# Patient Record
Sex: FEMALE | Race: WHITE | NOT HISPANIC OR LATINO | Employment: FULL TIME | URBAN - NONMETROPOLITAN AREA
[De-identification: names, ages, dates, MRNs, and addresses within clinical notes are randomized per-mention and may not be internally consistent; named-entity substitution may affect disease eponyms.]

---

## 2021-02-08 ENCOUNTER — IMMUNIZATIONS (OUTPATIENT)
Dept: FAMILY MEDICINE CLINIC | Facility: HOSPITAL | Age: 46
End: 2021-02-08

## 2021-02-08 DIAGNOSIS — Z23 ENCOUNTER FOR IMMUNIZATION: Primary | ICD-10-CM

## 2021-02-08 PROCEDURE — 0011A SARS-COV-2 / COVID-19 MRNA VACCINE (MODERNA) 100 MCG: CPT

## 2021-02-08 PROCEDURE — 91301 SARS-COV-2 / COVID-19 MRNA VACCINE (MODERNA) 100 MCG: CPT

## 2021-02-26 ENCOUNTER — APPOINTMENT (EMERGENCY)
Dept: RADIOLOGY | Facility: HOSPITAL | Age: 46
End: 2021-02-26
Payer: COMMERCIAL

## 2021-02-26 ENCOUNTER — HOSPITAL ENCOUNTER (EMERGENCY)
Facility: HOSPITAL | Age: 46
Discharge: HOME | End: 2021-02-26
Attending: EMERGENCY MEDICINE | Admitting: EMERGENCY MEDICINE
Payer: COMMERCIAL

## 2021-02-26 ENCOUNTER — APPOINTMENT (EMERGENCY)
Dept: CT IMAGING | Facility: HOSPITAL | Age: 46
End: 2021-02-26
Payer: COMMERCIAL

## 2021-02-26 VITALS
RESPIRATION RATE: 16 BRPM | WEIGHT: 271.17 LBS | BODY MASS INDEX: 45.18 KG/M2 | HEIGHT: 65 IN | TEMPERATURE: 98 F | SYSTOLIC BLOOD PRESSURE: 104 MMHG | OXYGEN SATURATION: 98 % | HEART RATE: 79 BPM | DIASTOLIC BLOOD PRESSURE: 58 MMHG

## 2021-02-26 DIAGNOSIS — R07.89 CHEST WALL PAIN: Primary | ICD-10-CM

## 2021-02-26 LAB
ALBUMIN SERPL BCP-MCNC: 3.2 G/DL (ref 3.5–5)
ALP SERPL-CCNC: 80 U/L (ref 46–116)
ALT SERPL W P-5'-P-CCNC: 23 U/L (ref 12–78)
ANION GAP SERPL CALCULATED.3IONS-SCNC: 7 MMOL/L (ref 4–13)
APTT PPP: 29 SECONDS (ref 23–37)
AST SERPL W P-5'-P-CCNC: 40 U/L (ref 5–45)
BASOPHILS # BLD AUTO: 0.04 THOUSANDS/ΜL (ref 0–0.1)
BASOPHILS NFR BLD AUTO: 0 % (ref 0–1)
BILIRUB SERPL-MCNC: 0.44 MG/DL (ref 0.2–1)
BUN SERPL-MCNC: 13 MG/DL (ref 5–25)
CALCIUM ALBUM COR SERPL-MCNC: 9.6 MG/DL (ref 8.3–10.1)
CALCIUM SERPL-MCNC: 9 MG/DL (ref 8.3–10.1)
CHLORIDE SERPL-SCNC: 103 MMOL/L (ref 100–108)
CO2 SERPL-SCNC: 26 MMOL/L (ref 21–32)
CREAT SERPL-MCNC: 0.69 MG/DL (ref 0.6–1.3)
D DIMER PPP FEU-MCNC: 0.79 UG/ML FEU
EOSINOPHIL # BLD AUTO: 0.24 THOUSAND/ΜL (ref 0–0.61)
EOSINOPHIL NFR BLD AUTO: 3 % (ref 0–6)
ERYTHROCYTE [DISTWIDTH] IN BLOOD BY AUTOMATED COUNT: 12.7 % (ref 11.6–15.1)
GFR SERPL CREATININE-BSD FRML MDRD: 103 ML/MIN/1.73SQ M
GLUCOSE SERPL-MCNC: 86 MG/DL (ref 65–140)
HCT VFR BLD AUTO: 44.5 % (ref 34.8–46.1)
HGB BLD-MCNC: 14.6 G/DL (ref 11.5–15.4)
IMM GRANULOCYTES # BLD AUTO: 0.02 THOUSAND/UL (ref 0–0.2)
IMM GRANULOCYTES NFR BLD AUTO: 0 % (ref 0–2)
INR PPP: 0.99 (ref 0.84–1.19)
LACTATE SERPL-SCNC: 0.9 MMOL/L (ref 0.5–2)
LIPASE SERPL-CCNC: 104 U/L (ref 73–393)
LYMPHOCYTES # BLD AUTO: 2.15 THOUSANDS/ΜL (ref 0.6–4.47)
LYMPHOCYTES NFR BLD AUTO: 23 % (ref 14–44)
MCH RBC QN AUTO: 31.4 PG (ref 26.8–34.3)
MCHC RBC AUTO-ENTMCNC: 32.8 G/DL (ref 31.4–37.4)
MCV RBC AUTO: 96 FL (ref 82–98)
MONOCYTES # BLD AUTO: 0.81 THOUSAND/ΜL (ref 0.17–1.22)
MONOCYTES NFR BLD AUTO: 9 % (ref 4–12)
NEUTROPHILS # BLD AUTO: 6.05 THOUSANDS/ΜL (ref 1.85–7.62)
NEUTS SEG NFR BLD AUTO: 65 % (ref 43–75)
NRBC BLD AUTO-RTO: 0 /100 WBCS
NT-PROBNP SERPL-MCNC: 127 PG/ML
PLATELET # BLD AUTO: 221 THOUSANDS/UL (ref 149–390)
PMV BLD AUTO: 10.6 FL (ref 8.9–12.7)
POTASSIUM SERPL-SCNC: 5.4 MMOL/L (ref 3.5–5.3)
PROT SERPL-MCNC: 7.6 G/DL (ref 6.4–8.2)
PROTHROMBIN TIME: 12.9 SECONDS (ref 11.6–14.5)
RBC # BLD AUTO: 4.65 MILLION/UL (ref 3.81–5.12)
SODIUM SERPL-SCNC: 136 MMOL/L (ref 136–145)
TROPONIN I SERPL-MCNC: <0.02 NG/ML
WBC # BLD AUTO: 9.31 THOUSAND/UL (ref 4.31–10.16)

## 2021-02-26 PROCEDURE — 85730 THROMBOPLASTIN TIME PARTIAL: CPT | Performed by: EMERGENCY MEDICINE

## 2021-02-26 PROCEDURE — 83690 ASSAY OF LIPASE: CPT | Performed by: EMERGENCY MEDICINE

## 2021-02-26 PROCEDURE — 71046 X-RAY EXAM CHEST 2 VIEWS: CPT

## 2021-02-26 PROCEDURE — 83605 ASSAY OF LACTIC ACID: CPT | Performed by: EMERGENCY MEDICINE

## 2021-02-26 PROCEDURE — 96374 THER/PROPH/DIAG INJ IV PUSH: CPT

## 2021-02-26 PROCEDURE — 85610 PROTHROMBIN TIME: CPT | Performed by: EMERGENCY MEDICINE

## 2021-02-26 PROCEDURE — 84484 ASSAY OF TROPONIN QUANT: CPT | Performed by: EMERGENCY MEDICINE

## 2021-02-26 PROCEDURE — 36415 COLL VENOUS BLD VENIPUNCTURE: CPT | Performed by: EMERGENCY MEDICINE

## 2021-02-26 PROCEDURE — 71275 CT ANGIOGRAPHY CHEST: CPT

## 2021-02-26 PROCEDURE — 93005 ELECTROCARDIOGRAM TRACING: CPT

## 2021-02-26 PROCEDURE — 99285 EMERGENCY DEPT VISIT HI MDM: CPT | Performed by: EMERGENCY MEDICINE

## 2021-02-26 PROCEDURE — 96361 HYDRATE IV INFUSION ADD-ON: CPT

## 2021-02-26 PROCEDURE — 80053 COMPREHEN METABOLIC PANEL: CPT | Performed by: EMERGENCY MEDICINE

## 2021-02-26 PROCEDURE — 85025 COMPLETE CBC W/AUTO DIFF WBC: CPT | Performed by: EMERGENCY MEDICINE

## 2021-02-26 PROCEDURE — 85379 FIBRIN DEGRADATION QUANT: CPT | Performed by: EMERGENCY MEDICINE

## 2021-02-26 PROCEDURE — 83880 ASSAY OF NATRIURETIC PEPTIDE: CPT | Performed by: EMERGENCY MEDICINE

## 2021-02-26 PROCEDURE — 99284 EMERGENCY DEPT VISIT MOD MDM: CPT

## 2021-02-26 PROCEDURE — G1004 CDSM NDSC: HCPCS

## 2021-02-26 RX ORDER — KETOROLAC TROMETHAMINE 30 MG/ML
15 INJECTION, SOLUTION INTRAMUSCULAR; INTRAVENOUS ONCE
Status: COMPLETED | OUTPATIENT
Start: 2021-02-26 | End: 2021-02-26

## 2021-02-26 RX ORDER — NAPROXEN 500 MG/1
500 TABLET ORAL 2 TIMES DAILY WITH MEALS
Qty: 30 TABLET | Refills: 0 | Status: SHIPPED | OUTPATIENT
Start: 2021-02-26 | End: 2022-05-28

## 2021-02-26 RX ADMIN — IOHEXOL 85 ML: 350 INJECTION, SOLUTION INTRAVENOUS at 15:09

## 2021-02-26 RX ADMIN — KETOROLAC TROMETHAMINE 15 MG: 30 INJECTION, SOLUTION INTRAMUSCULAR at 14:25

## 2021-02-26 RX ADMIN — SODIUM CHLORIDE 1000 ML: 0.9 INJECTION, SOLUTION INTRAVENOUS at 14:25

## 2021-02-26 NOTE — ED PROVIDER NOTES
History  Chief Complaint   Patient presents with    Rib Pain     pt c/o left-sided rib pain w/intermittent sob and dizziness at times for past couple days  pt seen at urgent care and given instructions to f/u w/pcp in 1 wk-pt choose to come to ED per further evaluation  hx covid positive 12/3/20  1st covid vaccination beginning of 2021  denies travel/fevers/n/v/d     Patient was diagnosed with COVID at the beginning of December  Over the past few days has had left-sided rib pain  Gets worse with movement  Worse with lying on the left side  Worse with deep breath  Has a dry cough  Does smoke  Feels occasionally short of breath and dizzy  No nausea or vomiting or diarrhea  Nothing taken for her symptoms  No history of PE or DVT  No leg pain or leg swelling  Seen in urgent care center today and told was costochondritis  Patient wants a 2nd opinion  History provided by:  Patient   used: No    Chest Pain  Pain location:  L chest  Pain quality: aching    Pain radiates to:  Does not radiate  Pain radiates to the back: no    Pain severity:  Mild  Onset quality:  Gradual  Duration:  4 days  Timing:  Constant  Progression:  Unchanged  Context: no drug use, not eating and not raising an arm    Relieved by:  Nothing  Worsened by:  Coughing, deep breathing, movement and certain positions  Ineffective treatments:  None tried  Associated symptoms: cough, dizziness, shortness of breath and weakness    Associated symptoms: no abdominal pain, no altered mental status, no anxiety, no claudication, no dysphagia, no fever, no headache, no lower extremity edema, no nausea, no palpitations, no syncope and not vomiting        None       History reviewed  No pertinent past medical history  Past Surgical History:   Procedure Laterality Date    APPENDECTOMY       SECTION      x 3    CHOLECYSTECTOMY      HYSTERECTOMY      KNEE ARTHROSCOPY Bilateral        No family history on file    I have reviewed and agree with the history as documented  E-Cigarette/Vaping     E-Cigarette/Vaping Substances     Social History     Tobacco Use    Smoking status: Not on file   Substance Use Topics    Alcohol use: Not on file    Drug use: Not on file       Review of Systems   Constitutional: Negative for chills and fever  HENT: Negative for ear pain, hearing loss, sore throat, trouble swallowing and voice change  Eyes: Negative for pain and discharge  Respiratory: Positive for cough and shortness of breath  Negative for wheezing  Cardiovascular: Positive for chest pain  Negative for palpitations, claudication and syncope  Gastrointestinal: Negative for abdominal pain, blood in stool, constipation, diarrhea, nausea and vomiting  Genitourinary: Negative for dysuria, flank pain, frequency and hematuria  Musculoskeletal: Negative for joint swelling, neck pain and neck stiffness  Skin: Negative for rash and wound  Neurological: Positive for dizziness and weakness  Negative for seizures, syncope, facial asymmetry and headaches  Psychiatric/Behavioral: Negative for hallucinations, self-injury and suicidal ideas  All other systems reviewed and are negative  Physical Exam  Physical Exam  Vitals signs and nursing note reviewed  Constitutional:       General: She is not in acute distress  Appearance: She is well-developed  HENT:      Head: Normocephalic and atraumatic  Right Ear: External ear normal       Left Ear: External ear normal    Eyes:      Conjunctiva/sclera: Conjunctivae normal       Pupils: Pupils are equal, round, and reactive to light  Neck:      Musculoskeletal: Normal range of motion and neck supple  Cardiovascular:      Rate and Rhythm: Normal rate and regular rhythm  Heart sounds: Normal heart sounds  No murmur  Pulmonary:      Effort: Pulmonary effort is normal       Breath sounds: Normal breath sounds  No wheezing or rales        Comments: Tender to palpation to the left lower chest   There are no lesions noted  No obvious step-off or bony deformity  No crepitus  Pain is also reproducible with certain movements  Abdominal:      General: Bowel sounds are normal  There is no distension  Palpations: Abdomen is soft  Tenderness: There is no abdominal tenderness  There is no guarding or rebound  Musculoskeletal: Normal range of motion  General: No deformity  Skin:     General: Skin is warm and dry  Findings: No rash  Neurological:      General: No focal deficit present  Mental Status: She is alert and oriented to person, place, and time  Cranial Nerves: No cranial nerve deficit     Psychiatric:         Behavior: Behavior normal          Vital Signs  ED Triage Vitals [02/26/21 1400]   Temperature Pulse Respirations Blood Pressure SpO2   98 °F (36 7 °C) 80 17 145/100 100 %      Temp Source Heart Rate Source Patient Position - Orthostatic VS BP Location FiO2 (%)   Temporal Monitor Sitting Right arm --      Pain Score       5           Vitals:    02/26/21 1400 02/26/21 1500   BP: 145/100 125/82   Pulse: 80 74   Patient Position - Orthostatic VS: Sitting          Visual Acuity      ED Medications  Medications   sodium chloride 0 9 % bolus 1,000 mL (0 mL Intravenous Stopped 2/26/21 1539)   ketorolac (TORADOL) injection 15 mg (15 mg Intravenous Given 2/26/21 1425)   iohexol (OMNIPAQUE) 350 MG/ML injection (SINGLE-DOSE) 85 mL (85 mL Intravenous Given 2/26/21 1509)       Diagnostic Studies  Results Reviewed     Procedure Component Value Units Date/Time    Lipase [[id number]]  (Normal) Collected: 02/26/21 1425    Lab Status: Final result Specimen: Blood from Arm, Right Updated: 02/26/21 1455     Lipase 104 u/L     NT-BNP PRO [[id number]]  (Abnormal) Collected: 02/26/21 1425    Lab Status: Final result Specimen: Blood from Arm, Right Updated: 02/26/21 1455     NT-proBNP 127 pg/mL     Troponin I [[id number]]  (Normal) Collected: 02/26/21 1425    Lab Status: Final result Specimen: Blood from Arm, Right Updated: 02/26/21 1454     Troponin I <0 02 ng/mL     Comprehensive metabolic panel [[id number]]  (Abnormal) Collected: 02/26/21 1425    Lab Status: Final result Specimen: Blood from Arm, Right Updated: 02/26/21 1452     Sodium 136 mmol/L      Potassium 5 4 mmol/L      Chloride 103 mmol/L      CO2 26 mmol/L      ANION GAP 7 mmol/L      BUN 13 mg/dL      Creatinine 0 69 mg/dL      Glucose 86 mg/dL      Calcium 9 0 mg/dL      Corrected Calcium 9 6 mg/dL      AST 40 U/L      ALT 23 U/L      Alkaline Phosphatase 80 U/L      Total Protein 7 6 g/dL      Albumin 3 2 g/dL      Total Bilirubin 0 44 mg/dL      eGFR 103 ml/min/1 73sq m     Narrative:      Jimena guidelines for Chronic Kidney Disease (CKD):     Stage 1 with normal or high GFR (GFR > 90 mL/min/1 73 square meters)    Stage 2 Mild CKD (GFR = 60-89 mL/min/1 73 square meters)    Stage 3A Moderate CKD (GFR = 45-59 mL/min/1 73 square meters)    Stage 3B Moderate CKD (GFR = 30-44 mL/min/1 73 square meters)    Stage 4 Severe CKD (GFR = 15-29 mL/min/1 73 square meters)    Stage 5 End Stage CKD (GFR <15 mL/min/1 73 square meters)  Note: GFR calculation is accurate only with a steady state creatinine    D-Dimer [[id number]]  (Abnormal) Collected: 02/26/21 1425    Lab Status: Final result Specimen: Blood from Arm, Right Updated: 02/26/21 1451     D-Dimer, Quant 0 79 ug/ml FEU     Lactic acid [[id number]]  (Normal) Collected: 02/26/21 1425    Lab Status: Final result Specimen: Blood from Arm, Right Updated: 02/26/21 1451     LACTIC ACID 0 9 mmol/L     Narrative:      Result may be elevated if tourniquet was used during collection      Protime-INR [[id number]]  (Normal) Collected: 02/26/21 1425    Lab Status: Final result Specimen: Blood from Arm, Right Updated: 02/26/21 1445     Protime 12 9 seconds      INR 0 99    APTT [[id number]]  (Normal) Collected: 02/26/21 1425    Lab Status: Final result Specimen: Blood from Arm, Right Updated: 02/26/21 1445     PTT 29 seconds     CBC and differential [[id number]] Collected: 02/26/21 1425    Lab Status: Final result Specimen: Blood from Arm, Right Updated: 02/26/21 1434     WBC 9 31 Thousand/uL      RBC 4 65 Million/uL      Hemoglobin 14 6 g/dL      Hematocrit 44 5 %      MCV 96 fL      MCH 31 4 pg      MCHC 32 8 g/dL      RDW 12 7 %      MPV 10 6 fL      Platelets 110 Thousands/uL      nRBC 0 /100 WBCs      Neutrophils Relative 65 %      Immat GRANS % 0 %      Lymphocytes Relative 23 %      Monocytes Relative 9 %      Eosinophils Relative 3 %      Basophils Relative 0 %      Neutrophils Absolute 6 05 Thousands/µL      Immature Grans Absolute 0 02 Thousand/uL      Lymphocytes Absolute 2 15 Thousands/µL      Monocytes Absolute 0 81 Thousand/µL      Eosinophils Absolute 0 24 Thousand/µL      Basophils Absolute 0 04 Thousands/µL                  CTA ED chest PE study   Final Result by Marisa Persaud MD (02/26 1600)      No acute CT findings  Workstation performed: [id number]         XR chest 2 views   Final Result by Munira Adams MD (02/26 1501)   No acute cardiopulmonary disease  Findings are stable            Workstation performed: [id number]                    Procedures  ECG 12 Lead Documentation Only    Date/Time: 2/26/2021 2:17 PM  Performed by: Megan Cantu MD  Authorized by: Megan Cantu MD     ECG reviewed by me, the ED Provider: yes    Patient location:  ED  Previous ECG:     Previous ECG:  Unavailable  Rate:     ECG rate:  80  Rhythm:     Rhythm: sinus rhythm    Ectopy:     Ectopy: none    QRS:     QRS axis:  Normal             ED Course                                           MDM  Number of Diagnoses or Management Options  Diagnosis management comments: Symptoms are reproducible palpation or movement  Most likely musculoskeletal in origin  EKG and troponin are unremarkable    CTA of the chest was unremarkable  Amount and/or Complexity of Data Reviewed  Clinical lab tests: reviewed  Review and summarize past medical records: yes        Disposition  Final diagnoses:   Chest wall pain     Time reflects when diagnosis was documented in both MDM as applicable and the Disposition within this note     Time User Action Codes Description Comment    2/26/2021  4:10 PM Melida Gonzalez Add [U09 35] Chest wall pain       ED Disposition     ED Disposition Condition Date/Time Comment    Discharge Stable Fri Feb 26, 2021  4:10 PM Paulette Downey discharge to home/self care  Follow-up Information    None         Patient's Medications   Discharge Prescriptions    NAPROXEN (NAPROSYN) 500 MG TABLET    Take 1 tablet (500 mg total) by mouth 2 (two) times a day with meals       Start Date: 2/26/2021 End Date: --       Order Dose: 500 mg       Quantity: 30 tablet    Refills: 0     No discharge procedures on file      PDMP Review     None          ED Provider  Electronically Signed by           Yuan Deras MD  02/26/21 4626

## 2021-02-28 LAB
ATRIAL RATE: 77 BPM
P AXIS: 26 DEGREES
PR INTERVAL: 130 MS
QRS AXIS: 49 DEGREES
QRSD INTERVAL: 82 MS
QT INTERVAL: 382 MS
QTC INTERVAL: 432 MS
T WAVE AXIS: 25 DEGREES
VENTRICULAR RATE: 77 BPM

## 2021-03-08 ENCOUNTER — IMMUNIZATIONS (OUTPATIENT)
Dept: FAMILY MEDICINE CLINIC | Facility: HOSPITAL | Age: 46
End: 2021-03-08

## 2021-03-08 DIAGNOSIS — Z23 ENCOUNTER FOR IMMUNIZATION: Primary | ICD-10-CM

## 2021-03-08 PROCEDURE — 0012A SARS-COV-2 / COVID-19 MRNA VACCINE (MODERNA) 100 MCG: CPT

## 2021-03-08 PROCEDURE — 91301 SARS-COV-2 / COVID-19 MRNA VACCINE (MODERNA) 100 MCG: CPT

## 2021-04-08 ENCOUNTER — NURSE TRIAGE (OUTPATIENT)
Dept: OTHER | Facility: OTHER | Age: 46
End: 2021-04-08

## 2021-04-08 DIAGNOSIS — Z20.828 SARS-ASSOCIATED CORONAVIRUS EXPOSURE: Primary | ICD-10-CM

## 2021-04-08 DIAGNOSIS — Z20.828 SARS-ASSOCIATED CORONAVIRUS EXPOSURE: ICD-10-CM

## 2021-04-08 PROCEDURE — U0005 INFEC AGEN DETEC AMPLI PROBE: HCPCS | Performed by: FAMILY MEDICINE

## 2021-04-08 PROCEDURE — U0003 INFECTIOUS AGENT DETECTION BY NUCLEIC ACID (DNA OR RNA); SEVERE ACUTE RESPIRATORY SYNDROME CORONAVIRUS 2 (SARS-COV-2) (CORONAVIRUS DISEASE [COVID-19]), AMPLIFIED PROBE TECHNIQUE, MAKING USE OF HIGH THROUGHPUT TECHNOLOGIES AS DESCRIBED BY CMS-2020-01-R: HCPCS | Performed by: FAMILY MEDICINE

## 2021-04-08 NOTE — TELEPHONE ENCOUNTER
Reason for Disposition   [1] COVID-19 infection suspected by caller or triager AND [2] mild symptoms (cough, fever, or others) AND [2] no complications or SOB    Answer Assessment - Initial Assessment Questions  1  COVID-19 DIAGNOSIS: "Who made your Coronavirus (COVID-19) diagnosis?" "Was it confirmed by a positive lab test?" If not diagnosed by a HCP, ask "Are there lots of cases (community spread) where you live?" (See public health department website, if unsure)      N/A  2  COVID-19 EXPOSURE: "Was there any known exposure to [facility] before the symptoms began?" [facility] Definition of close contact: within 6 feet (2 meters) for a total of 15 minutes or more over a 24-hour period  Exposed to granddaughter who is covid positive  3  ONSET: "When did the COVID-19 symptoms start?"       Yesterday  4  WORST SYMPTOM: "What is your worst symptom?" (e g , cough, fever, shortness of breath, muscle aches)      Sore throat  5  COUGH: "Do you have a cough?" If so, ask: "How bad is the cough?"        Mild  6  FEVER: "Do you have a fever?" If so, ask: "What is your temperature, how was it measured, and when did it start?"      Unsyre  7  RESPIRATORY STATUS: "Describe your breathing?" (e g , shortness of breath, wheezing, unable to speak)       Normal  8  BETTER-SAME-WORSE: "Are you getting better, staying the same or getting worse compared to yesterday?"  If getting worse, ask, "In what way?"      Worse  9  HIGH RISK DISEASE: "Do you have any chronic medical problems?" (e g , asthma, heart or lung disease, weak immune system, obesity, etc )      No  10  PREGNANCY: "Is there any chance you are pregnant?" "When was your last menstrual period?"        No  11   OTHER SYMPTOMS: "Do you have any other symptoms?"  (e g , chills, fatigue, headache, loss of smell or taste, muscle pain, sore throat; new loss of smell or taste especially support the diagnosis of COVID-19)        No    Protocols used: CORONAVIRUS (COVID-19) DIAGNOSED OR SUSPECTED-ADULT-AH

## 2021-04-08 NOTE — TELEPHONE ENCOUNTER
Regarding: Covid / symptomatic test request  ----- Message from Carroll Terry RN sent at 4/8/2021 11:15 AM EDT -----  " I had an exposure and now have a cough and sore throat "

## 2021-04-09 LAB — SARS-COV-2 RNA RESP QL NAA+PROBE: NEGATIVE

## 2021-09-27 ENCOUNTER — OFFICE VISIT (OUTPATIENT)
Dept: OBGYN CLINIC | Facility: CLINIC | Age: 46
End: 2021-09-27
Payer: COMMERCIAL

## 2021-09-27 VITALS
SYSTOLIC BLOOD PRESSURE: 140 MMHG | BODY MASS INDEX: 44.15 KG/M2 | HEIGHT: 65 IN | DIASTOLIC BLOOD PRESSURE: 82 MMHG | HEART RATE: 90 BPM | WEIGHT: 265 LBS | TEMPERATURE: 97.3 F

## 2021-09-27 DIAGNOSIS — M77.12 LATERAL EPICONDYLITIS OF LEFT ELBOW: ICD-10-CM

## 2021-09-27 DIAGNOSIS — M25.522 LEFT ELBOW PAIN: Primary | ICD-10-CM

## 2021-09-27 PROCEDURE — 20551 NJX 1 TENDON ORIGIN/INSJ: CPT | Performed by: ORTHOPAEDIC SURGERY

## 2021-09-27 PROCEDURE — 99243 OFF/OP CNSLTJ NEW/EST LOW 30: CPT | Performed by: ORTHOPAEDIC SURGERY

## 2021-09-27 RX ORDER — TRIAMCINOLONE ACETONIDE 40 MG/ML
20 INJECTION, SUSPENSION INTRA-ARTICULAR; INTRAMUSCULAR
Status: COMPLETED | OUTPATIENT
Start: 2021-09-27 | End: 2021-09-27

## 2021-09-27 RX ORDER — LIDOCAINE HYDROCHLORIDE 20 MG/ML
1 INJECTION, SOLUTION INFILTRATION; PERINEURAL
Status: COMPLETED | OUTPATIENT
Start: 2021-09-27 | End: 2021-09-27

## 2021-09-27 RX ADMIN — TRIAMCINOLONE ACETONIDE 20 MG: 40 INJECTION, SUSPENSION INTRA-ARTICULAR; INTRAMUSCULAR at 10:14

## 2021-09-27 RX ADMIN — LIDOCAINE HYDROCHLORIDE 1 ML: 20 INJECTION, SOLUTION INFILTRATION; PERINEURAL at 10:14

## 2021-09-27 NOTE — PROGRESS NOTES
ASSESSMENT/PLAN:    Diagnoses and all orders for this visit:    Left elbow pain    Lateral epicondylitis of left elbow        Plan: Treatment options were discussed  She elected to proceed with injection which was performed without difficulty  In addition, home exercises were reviewed and a prescription for Naprosyn was sent to her pharmacy  I will see her in 2 or 3 weeks for re-evaluation  She was encouraged to contact me if any questions or concerns arise  Return for 2-3 week       _____________________________________________________  CHIEF COMPLAINT:  Chief Complaint   Patient presents with    Left Elbow - Pain         SUBJECTIVE:  Naun Thomas is a 48y o  year old left-hand on female who presents for evaluation of chronic left elbow pain of several months duration  She denies trauma upon initial questioning but then recall striking her funny bone and believes symptoms may be related to that episode  She describes pain over the lateral and posterolateral elbow  Pain has become constant and is a dull aching like pain which is aggravated by activity  She denies paresthesias  She has had no specific treatment  She has had no diagnostic studies  She was seen by her primary care physician and told she had tennis elbow  She presents now for orthopedic evaluation and treatment of her own accord  PAST MEDICAL HISTORY:  History reviewed  No pertinent past medical history      PAST SURGICAL HISTORY:  Past Surgical History:   Procedure Laterality Date    APPENDECTOMY       SECTION      x 3    CHOLECYSTECTOMY      HYSTERECTOMY      KNEE ARTHROSCOPY Bilateral        FAMILY HISTORY:  Family History   Problem Relation Age of Onset    Diabetes Mother     Cancer Father        SOCIAL HISTORY:  Social History     Tobacco Use    Smoking status: Current Every Day Smoker     Packs/day: 0 50     Types: Cigarettes    Smokeless tobacco: Never Used    Tobacco comment: 30 pk yr hx   Vaping Use    Vaping Use: Never used   Substance Use Topics    Alcohol use: Yes     Comment: occasionally    Drug use: Never       MEDICATIONS:    Current Outpatient Medications:     naproxen (NAPROSYN) 500 mg tablet, Take 1 tablet (500 mg total) by mouth 2 (two) times a day with meals (Patient not taking: Reported on 9/27/2021), Disp: 30 tablet, Rfl: 0    ALLERGIES:  No Known Allergies    Review of systems:   Constitutional: Negative for fatigue, fever or loss of apetite  HENT: Negative  Respiratory: Negative for shortness of breath, dyspnea  Cardiovascular: Negative for chest pain/tightness  Gastrointestinal: Negative for abdominal pain, N/V  Endocrine: Negative for cold/heat intolerance, unexplained weight loss/gain  Genitourinary: Negative for flank pain, dysuria, hematuria  Musculoskeletal:  Positive as in the HPI   Skin: Negative for rash  Neurological:  Negative  Psychiatric/Behavioral: Negative for agitation  _____________________________________________________  PHYSICAL EXAMINATION:    Blood pressure 140/82, pulse 90, temperature (!) 97 3 °F (36 3 °C), temperature source Temporal, height 5' 5" (1 651 m), weight 120 kg (265 lb)  General: well developed and well nourished, alert, oriented times 3 and appears comfortable  Psychiatric: Normal  HEENT: Benign  Cardiovascular: Regular    Pulmonary: No wheezing or stridor  Abdomen: Soft, Nontender  Skin: No masses, erythema, lacerations, fluctation, ulcerations  Neurovascular: Motor and sensory exams are grossly intact and pulses are palpable  MUSCULOSKELETAL EXAMINATION:    The left elbow exam demonstrates full range of motion  She does complain of pain with end range extension  She does have pain at end range flexion but admits this is no greater than at rest   She denies pain with forearm rotation without resistance    She complains of significant pain during resisted supination, wrist dorsiflexion and mild pain during resisted wrist volar flexion  She has no complaints during resisted pronation  She has tenderness over the lateral epicondyle and extensor/supinator origin  She does complain of some pain with resisted index finger extension  The remainder of the upper extremity examination bilaterally is benign  PROCEDURES:  Hand/upper extremity injection: L elbow  Universal Protocol:  Consent: Verbal consent obtained    Consent given by: patient  Patient understanding: patient states understanding of the procedure being performed    Supporting Documentation  Indications: pain   Procedure Details  Condition:lateral epicondylitis Site: L elbow   Needle size: 25 G  Approach: lateral  Medications administered: 1 mL lidocaine 2 %; 20 mg triamcinolone acetonide 40 mg/mL               Biju Villalba

## 2021-09-27 NOTE — LETTER
September 27, 2021     Lavell Jessdereje DO [address]    Patient: Naun Thomas   YOB: 1975   Date of Visit: 9/27/2021       Dear Dr Elvira Moss:    Thank you for referring Naun Thomas to me for evaluation  Below are my notes for this consultation  If you have questions, please do not hesitate to call me  I look forward to following your patient along with you  Sincerely,        Anna Marie Carvalho        CC: No Recipients  Anna Marie Carvalho  9/27/2021 10:29 AM  Signed  ASSESSMENT/PLAN:    Diagnoses and all orders for this visit:    Left elbow pain    Lateral epicondylitis of left elbow        Plan: Treatment options were discussed  She elected to proceed with injection which was performed without difficulty  In addition, home exercises were reviewed and a prescription for Naprosyn was sent to her pharmacy  I will see her in 2 or 3 weeks for re-evaluation  She was encouraged to contact me if any questions or concerns arise  Return for 2-3 week       _____________________________________________________  CHIEF COMPLAINT:  Chief Complaint   Patient presents with    Left Elbow - Pain         SUBJECTIVE:  Naun Thomas is a 48y o  year old left-hand on female who presents for evaluation of chronic left elbow pain of several months duration  She denies trauma upon initial questioning but then recall striking her funny bone and believes symptoms may be related to that episode  She describes pain over the lateral and posterolateral elbow  Pain has become constant and is a dull aching like pain which is aggravated by activity  She denies paresthesias  She has had no specific treatment  She has had no diagnostic studies  She was seen by her primary care physician and told she had tennis elbow  She presents now for orthopedic evaluation and treatment of her own accord  PAST MEDICAL HISTORY:  History reviewed  No pertinent past medical history      PAST SURGICAL HISTORY:  Past Surgical History:   Procedure Laterality Date    APPENDECTOMY       SECTION      x 3    CHOLECYSTECTOMY      HYSTERECTOMY      KNEE ARTHROSCOPY Bilateral        FAMILY HISTORY:  Family History   Problem Relation Age of Onset    Diabetes Mother     Cancer Father        SOCIAL HISTORY:  Social History     Tobacco Use    Smoking status: Current Every Day Smoker     Packs/day: 0 50     Types: Cigarettes    Smokeless tobacco: Never Used    Tobacco comment: 30 pk yr hx   Vaping Use    Vaping Use: Never used   Substance Use Topics    Alcohol use: Yes     Comment: occasionally    Drug use: Never       MEDICATIONS:    Current Outpatient Medications:     naproxen (NAPROSYN) 500 mg tablet, Take 1 tablet (500 mg total) by mouth 2 (two) times a day with meals (Patient not taking: Reported on 2021), Disp: 30 tablet, Rfl: 0    ALLERGIES:  No Known Allergies    Review of systems:   Constitutional: Negative for fatigue, fever or loss of apetite  HENT: Negative  Respiratory: Negative for shortness of breath, dyspnea  Cardiovascular: Negative for chest pain/tightness  Gastrointestinal: Negative for abdominal pain, N/V  Endocrine: Negative for cold/heat intolerance, unexplained weight loss/gain  Genitourinary: Negative for flank pain, dysuria, hematuria  Musculoskeletal:  Positive as in the HPI   Skin: Negative for rash  Neurological:  Negative  Psychiatric/Behavioral: Negative for agitation  _____________________________________________________  PHYSICAL EXAMINATION:    Blood pressure 140/82, pulse 90, temperature (!) 97 3 °F (36 3 °C), temperature source Temporal, height 5' 5" (1 651 m), weight 120 kg (265 lb)      General: well developed and well nourished, alert, oriented times 3 and appears comfortable  Psychiatric: Normal  HEENT: Benign  Cardiovascular: Regular    Pulmonary: No wheezing or stridor  Abdomen: Soft, Nontender  Skin: No masses, erythema, lacerations, fluctation, ulcerations  Neurovascular: Motor and sensory exams are grossly intact and pulses are palpable  MUSCULOSKELETAL EXAMINATION:    The left elbow exam demonstrates full range of motion  She does complain of pain with end range extension  She does have pain at end range flexion but admits this is no greater than at rest   She denies pain with forearm rotation without resistance  She complains of significant pain during resisted supination, wrist dorsiflexion and mild pain during resisted wrist volar flexion  She has no complaints during resisted pronation  She has tenderness over the lateral epicondyle and extensor/supinator origin  She does complain of some pain with resisted index finger extension  The remainder of the upper extremity examination bilaterally is benign  PROCEDURES:  Hand/upper extremity injection: L elbow  Universal Protocol:  Consent: Verbal consent obtained    Consent given by: patient  Patient understanding: patient states understanding of the procedure being performed    Supporting Documentation  Indications: pain   Procedure Details  Condition:lateral epicondylitis Site: L elbow   Needle size: 25 G  Approach: lateral  Medications administered: 1 mL lidocaine 2 %; 20 mg triamcinolone acetonide 40 mg/mL               Richi aMo

## 2021-09-28 ENCOUNTER — TELEPHONE (OUTPATIENT)
Dept: OBGYN CLINIC | Facility: CLINIC | Age: 46
End: 2021-09-28

## 2022-05-28 ENCOUNTER — APPOINTMENT (EMERGENCY)
Dept: CT IMAGING | Facility: HOSPITAL | Age: 47
End: 2022-05-28
Payer: COMMERCIAL

## 2022-05-28 ENCOUNTER — APPOINTMENT (EMERGENCY)
Dept: RADIOLOGY | Facility: HOSPITAL | Age: 47
End: 2022-05-28
Payer: COMMERCIAL

## 2022-05-28 ENCOUNTER — HOSPITAL ENCOUNTER (EMERGENCY)
Facility: HOSPITAL | Age: 47
Discharge: HOME | End: 2022-05-28
Attending: EMERGENCY MEDICINE | Admitting: EMERGENCY MEDICINE
Payer: COMMERCIAL

## 2022-05-28 VITALS
BODY MASS INDEX: 45.47 KG/M2 | WEIGHT: 272.93 LBS | TEMPERATURE: 98.1 F | DIASTOLIC BLOOD PRESSURE: 63 MMHG | OXYGEN SATURATION: 99 % | HEART RATE: 89 BPM | SYSTOLIC BLOOD PRESSURE: 108 MMHG | RESPIRATION RATE: 16 BRPM | HEIGHT: 65 IN

## 2022-05-28 DIAGNOSIS — J06.9 VIRAL URI WITH COUGH: ICD-10-CM

## 2022-05-28 DIAGNOSIS — R06.02 SOB (SHORTNESS OF BREATH): Primary | ICD-10-CM

## 2022-05-28 DIAGNOSIS — J30.2 SEASONAL ALLERGIES: ICD-10-CM

## 2022-05-28 LAB
ALBUMIN SERPL BCP-MCNC: 3.3 G/DL (ref 3.5–5)
ALP SERPL-CCNC: 93 U/L (ref 46–116)
ALT SERPL W P-5'-P-CCNC: 16 U/L (ref 12–78)
ANION GAP SERPL CALCULATED.3IONS-SCNC: 9 MMOL/L (ref 4–13)
AST SERPL W P-5'-P-CCNC: 23 U/L (ref 5–45)
BASE EX.OXY STD BLDV CALC-SCNC: 86.2 % (ref 60–80)
BASE EXCESS BLDV CALC-SCNC: -1.8 MMOL/L
BASOPHILS # BLD AUTO: 0.05 THOUSANDS/ΜL (ref 0–0.1)
BASOPHILS NFR BLD AUTO: 0 % (ref 0–1)
BILIRUB SERPL-MCNC: 0.32 MG/DL (ref 0.2–1)
BUN SERPL-MCNC: 12 MG/DL (ref 5–25)
CALCIUM ALBUM COR SERPL-MCNC: 9.4 MG/DL (ref 8.3–10.1)
CALCIUM SERPL-MCNC: 8.8 MG/DL (ref 8.3–10.1)
CARDIAC TROPONIN I PNL SERPL HS: <2 NG/L
CHLORIDE SERPL-SCNC: 103 MMOL/L (ref 100–108)
CO2 SERPL-SCNC: 25 MMOL/L (ref 21–32)
CREAT SERPL-MCNC: 0.84 MG/DL (ref 0.6–1.3)
D DIMER PPP FEU-MCNC: 0.9 UG/ML FEU
EOSINOPHIL # BLD AUTO: 0.31 THOUSAND/ΜL (ref 0–0.61)
EOSINOPHIL NFR BLD AUTO: 2 % (ref 0–6)
ERYTHROCYTE [DISTWIDTH] IN BLOOD BY AUTOMATED COUNT: 12.4 % (ref 11.6–15.1)
FLUAV RNA RESP QL NAA+PROBE: NEGATIVE
FLUBV RNA RESP QL NAA+PROBE: NEGATIVE
GFR SERPL CREATININE-BSD FRML MDRD: 81 ML/MIN/1.73SQ M
GLUCOSE SERPL-MCNC: 104 MG/DL (ref 65–140)
HCO3 BLDV-SCNC: 20 MMOL/L (ref 24–30)
HCT VFR BLD AUTO: 43.5 % (ref 34.8–46.1)
HGB BLD-MCNC: 14 G/DL (ref 11.5–15.4)
IMM GRANULOCYTES # BLD AUTO: 0.05 THOUSAND/UL (ref 0–0.2)
IMM GRANULOCYTES NFR BLD AUTO: 0 % (ref 0–2)
LYMPHOCYTES # BLD AUTO: 2.79 THOUSANDS/ΜL (ref 0.6–4.47)
LYMPHOCYTES NFR BLD AUTO: 21 % (ref 14–44)
MCH RBC QN AUTO: 31.1 PG (ref 26.8–34.3)
MCHC RBC AUTO-ENTMCNC: 32.2 G/DL (ref 31.4–37.4)
MCV RBC AUTO: 97 FL (ref 82–98)
MONOCYTES # BLD AUTO: 1.46 THOUSAND/ΜL (ref 0.17–1.22)
MONOCYTES NFR BLD AUTO: 11 % (ref 4–12)
NEUTROPHILS # BLD AUTO: 8.63 THOUSANDS/ΜL (ref 1.85–7.62)
NEUTS SEG NFR BLD AUTO: 66 % (ref 43–75)
NRBC BLD AUTO-RTO: 0 /100 WBCS
NT-PROBNP SERPL-MCNC: 63 PG/ML
O2 CT BLDV-SCNC: 18 ML/DL
PCO2 BLDV: 26.9 MM HG (ref 42–50)
PH BLDV: 7.49 [PH] (ref 7.3–7.4)
PLATELET # BLD AUTO: 268 THOUSANDS/UL (ref 149–390)
PMV BLD AUTO: 10.9 FL (ref 8.9–12.7)
PO2 BLDV: 55.3 MM HG (ref 35–45)
POTASSIUM SERPL-SCNC: 4.6 MMOL/L (ref 3.5–5.3)
PROT SERPL-MCNC: 7.7 G/DL (ref 6.4–8.2)
RBC # BLD AUTO: 4.5 MILLION/UL (ref 3.81–5.12)
RSV RNA RESP QL NAA+PROBE: NEGATIVE
SARS-COV-2 RNA RESP QL NAA+PROBE: NEGATIVE
SODIUM SERPL-SCNC: 137 MMOL/L (ref 136–145)
WBC # BLD AUTO: 13.29 THOUSAND/UL (ref 4.31–10.16)

## 2022-05-28 PROCEDURE — 80053 COMPREHEN METABOLIC PANEL: CPT | Performed by: EMERGENCY MEDICINE

## 2022-05-28 PROCEDURE — 84484 ASSAY OF TROPONIN QUANT: CPT | Performed by: EMERGENCY MEDICINE

## 2022-05-28 PROCEDURE — 99285 EMERGENCY DEPT VISIT HI MDM: CPT

## 2022-05-28 PROCEDURE — 71275 CT ANGIOGRAPHY CHEST: CPT

## 2022-05-28 PROCEDURE — 0241U HB NFCT DS VIR RESP RNA 4 TRGT: CPT | Performed by: EMERGENCY MEDICINE

## 2022-05-28 PROCEDURE — 36415 COLL VENOUS BLD VENIPUNCTURE: CPT | Performed by: EMERGENCY MEDICINE

## 2022-05-28 PROCEDURE — 83880 ASSAY OF NATRIURETIC PEPTIDE: CPT | Performed by: EMERGENCY MEDICINE

## 2022-05-28 PROCEDURE — 99285 EMERGENCY DEPT VISIT HI MDM: CPT | Performed by: EMERGENCY MEDICINE

## 2022-05-28 PROCEDURE — 82805 BLOOD GASES W/O2 SATURATION: CPT | Performed by: EMERGENCY MEDICINE

## 2022-05-28 PROCEDURE — 71045 X-RAY EXAM CHEST 1 VIEW: CPT

## 2022-05-28 PROCEDURE — 85379 FIBRIN DEGRADATION QUANT: CPT | Performed by: EMERGENCY MEDICINE

## 2022-05-28 PROCEDURE — 85025 COMPLETE CBC W/AUTO DIFF WBC: CPT | Performed by: EMERGENCY MEDICINE

## 2022-05-28 PROCEDURE — 93005 ELECTROCARDIOGRAM TRACING: CPT

## 2022-05-28 RX ORDER — ALBUTEROL SULFATE 90 UG/1
2 AEROSOL, METERED RESPIRATORY (INHALATION) EVERY 4 HOURS PRN
Qty: 18 G | Refills: 0 | Status: SHIPPED | OUTPATIENT
Start: 2022-05-28

## 2022-05-28 RX ORDER — ACETAMINOPHEN 325 MG/1
650 TABLET ORAL ONCE
Status: COMPLETED | OUTPATIENT
Start: 2022-05-28 | End: 2022-05-28

## 2022-05-28 RX ORDER — BENZONATATE 100 MG/1
100 CAPSULE ORAL EVERY 8 HOURS
Qty: 21 CAPSULE | Refills: 0 | Status: SHIPPED | OUTPATIENT
Start: 2022-05-28

## 2022-05-28 RX ORDER — CEFUROXIME AXETIL 250 MG/1
250 TABLET ORAL 2 TIMES DAILY
COMMUNITY
Start: 2022-03-23

## 2022-05-28 RX ADMIN — ACETAMINOPHEN 650 MG: 325 TABLET ORAL at 18:55

## 2022-05-28 RX ADMIN — IOHEXOL 70 ML: 350 INJECTION, SOLUTION INTRAVENOUS at 20:07

## 2022-05-28 NOTE — ED CARE HANDOFF
Emergency Department Sign Out Note        Sign out and transfer of care from Dr Siddiqui Salvage  See Separate Emergency Department note  The patient, Helen Martinez, was evaluated by the previous provider for SOB, persistent cough, chest tightness  Workup Completed:  Labs ordered and pending, CXR    ED Course / Workup Pending (followup):  D Dimer elevated, CTA ordered, will signs are stable, she does continue to have some chest tightness and a persistent cough which is nonproductive in nature, she is a smoker, has been on several antibiotic treatments over the past few weeks with no improvement of symptoms     CTA unremarkable and discussed with patient at bedside, patient remained stable, vital signs stable with no hypoxia, no acute distress, lungs are clear to auscultation, symptoms likely secondary to seasonal allergies versus sequela of smoking/COPD, therefore started on medications for symptom relief, advised to follow-up with PCP, discontinue smoking, return if symptoms worsen, patient acknowledges understanding and agreement with this plan                                    Disposition  Final diagnoses:   Shortness of breath     Time reflects when diagnosis was documented in both MDM as applicable and the Disposition within this note     Time User Action Codes Description Comment    5/28/2022  6:41 PM Prem Allen Add [R06 02] Shortness of breath       ED Disposition     None      Follow-up Information    None       Patient's Medications   Discharge Prescriptions    No medications on file     No discharge procedures on file         ED Provider  Electronically Signed by     Bladimir Jimenes DO  05/28/22 6540

## 2022-05-28 NOTE — ED PROVIDER NOTES
History  Chief Complaint   Patient presents with    Shortness of Breath     Pt arrives reporting cough and exertional SOB x 3 weeks  Pt seen at multiple urgent care for same and is currently on cefuroxime 250mg BID  Pt also reporting substernal chest pain that began today  13-year-old female with past medical history pertinent for hysterectomy, cholecystectomy, , appendectomy who presents to the emergency department for ongoing chest pressure, shortness breath and cough for the last 3 weeks  States that she has been to multiple urgent cares for the same and is currently on cefuroxime 250 mg b i d   Also reporting substernal chest pain that began today  States feels like she has a pressure in her chest   States that shortness of breath x-rays started today  Cough has been ongoing for 3 weeks  States the urgent cares did not do any testing for any viruses, did not do any chest x-rays and to start her on antibiotic  Denies any nausea, vomiting or diarrhea  Denies any radiation of the chest pressure anywhere else  Denies any history of acid reflux  Denies any previous thrombus  Does smoke half pack a day for at least 20 years  Denies any wheezing  No URI symptoms currently  No fevers or chills  Denies any illicit drug use including cocaine  Denies any family history of heart attacks at a young age  Denies any diabetes, cholesterol issues or hypertension  No other concerns  Prior to Admission Medications   Prescriptions Last Dose Informant Patient Reported? Taking? cefuroxime (CEFTIN) 250 mg tablet   Yes Yes   Sig: Take 250 mg by mouth 2 (two) times a day      Facility-Administered Medications: None       History reviewed  No pertinent past medical history      Past Surgical History:   Procedure Laterality Date    APPENDECTOMY       SECTION      x 3    CHOLECYSTECTOMY      HYSTERECTOMY      KNEE ARTHROSCOPY Bilateral        Family History   Problem Relation Age of Onset    Diabetes Mother     Cancer Father      I have reviewed and agree with the history as documented  E-Cigarette/Vaping    E-Cigarette Use Never User      E-Cigarette/Vaping Substances     Social History     Tobacco Use    Smoking status: Current Every Day Smoker     Packs/day: 0 50     Types: Cigarettes    Smokeless tobacco: Never Used    Tobacco comment: 30 pk yr hx   Vaping Use    Vaping Use: Never used   Substance Use Topics    Alcohol use: Yes     Comment: occasionally    Drug use: Never       Review of Systems   Constitutional: Negative for activity change, appetite change, chills and fever  HENT: Negative for congestion, rhinorrhea and sore throat  Eyes: Negative for visual disturbance  Respiratory: Positive for cough and shortness of breath  Negative for chest tightness and wheezing  Cardiovascular: Positive for chest pain  Negative for palpitations and leg swelling  Gastrointestinal: Negative for abdominal pain, constipation, diarrhea, nausea and vomiting  Genitourinary: Negative for dysuria, flank pain and pelvic pain  Musculoskeletal: Negative for back pain and neck pain  Skin: Negative for rash  Neurological: Negative for weakness, numbness and headaches  Psychiatric/Behavioral: Negative for behavioral problems  Physical Exam  Physical Exam  Vitals and nursing note reviewed  Constitutional:       General: She is not in acute distress  Appearance: She is well-developed  She is not diaphoretic  HENT:      Head: Normocephalic and atraumatic  Right Ear: External ear normal       Left Ear: External ear normal       Nose: Nose normal    Eyes:      Pupils: Pupils are equal, round, and reactive to light  Cardiovascular:      Rate and Rhythm: Regular rhythm  Tachycardia present  Heart sounds: Normal heart sounds  Pulmonary:      Effort: Pulmonary effort is normal  No respiratory distress  Breath sounds: Normal breath sounds   No decreased breath sounds, wheezing, rhonchi or rales  Abdominal:      General: Bowel sounds are normal       Palpations: Abdomen is soft  Tenderness: There is no abdominal tenderness  Musculoskeletal:         General: No tenderness or deformity  Normal range of motion  Cervical back: Normal range of motion and neck supple  Right lower leg: No tenderness  No edema  Left lower leg: No tenderness  No edema  Skin:     General: Skin is warm and dry  Capillary Refill: Capillary refill takes less than 2 seconds  Neurological:      Mental Status: She is alert and oriented to person, place, and time  Motor: No abnormal muscle tone  Vital Signs  ED Triage Vitals [05/28/22 1832]   Temperature Pulse Respirations Blood Pressure SpO2   98 1 °F (36 7 °C) (!) 126 20 128/85 100 %      Temp Source Heart Rate Source Patient Position - Orthostatic VS BP Location FiO2 (%)   Temporal -- -- -- --      Pain Score       7           Vitals:    05/28/22 1832   BP: 128/85   Pulse: (!) 126         Visual Acuity      ED Medications  Medications   acetaminophen (TYLENOL) tablet 650 mg (650 mg Oral Given 5/28/22 1855)       Diagnostic Studies  Results Reviewed     Procedure Component Value Units Date/Time    Blood gas, venous [[id number]]  (Abnormal) Collected: 05/28/22 1846    Lab Status: Final result Specimen: Blood from Arm, Right Updated: 05/28/22 1856     pH, Eliseo 7 489     pCO2, Eliseo 26 9 mm Hg      pO2, Eliseo 55 3 mm Hg      HCO3, Eliseo 20 0 mmol/L      Base Excess, Eliseo -1 8 mmol/L      O2 Content, Eliseo 18 0 ml/dL      O2 HGB, VENOUS 86 2 %     COVID/FLU/RSV [[id number]] Collected: 05/28/22 1846    Lab Status:  In process Specimen: Nares from Nose Updated: 05/28/22 1855    CBC and differential [[id number]]  (Abnormal) Collected: 05/28/22 1846    Lab Status: Final result Specimen: Blood from Arm, Right Updated: 05/28/22 1854     WBC 13 29 Thousand/uL      RBC 4 50 Million/uL      Hemoglobin 14 0 g/dL      Hematocrit 43 5 %      MCV 97 fL      MCH 31 1 pg      MCHC 32 2 g/dL      RDW 12 4 %      MPV 10 9 fL      Platelets 036 Thousands/uL      nRBC 0 /100 WBCs      Neutrophils Relative 66 %      Immat GRANS % 0 %      Lymphocytes Relative 21 %      Monocytes Relative 11 %      Eosinophils Relative 2 %      Basophils Relative 0 %      Neutrophils Absolute 8 63 Thousands/µL      Immature Grans Absolute 0 05 Thousand/uL      Lymphocytes Absolute 2 79 Thousands/µL      Monocytes Absolute 1 46 Thousand/µL      Eosinophils Absolute 0 31 Thousand/µL      Basophils Absolute 0 05 Thousands/µL     D-dimer, quantitative [[id number]] Collected: 05/28/22 1846    Lab Status: In process Specimen: Blood from Arm, Left Updated: 05/28/22 1852    Comprehensive metabolic panel [[id number]] Collected: 05/28/22 1846    Lab Status: In process Specimen: Blood from Hand, Right Updated: 05/28/22 1852    NT-BNP PRO [[id number]] Collected: 05/28/22 1846    Lab Status: In process Specimen: Blood from Hand, Right Updated: 05/28/22 1852    HS Troponin 0hr (reflex protocol) [[id number]] Collected: 05/28/22 1846    Lab Status:  In process Specimen: Blood from Arm, Right Updated: 05/28/22 1852                 XR chest 1 view portable    (Results Pending)              Procedures  ECG 12 Lead Documentation Only    Date/Time: 5/28/2022 6:42 PM  Performed by: Farheen Reyes MD  Authorized by: Farheen Reyes MD     ECG reviewed by me, the ED Provider: yes    Patient location:  ED  Previous ECG:     Previous ECG:  Compared to current    Similarity:  No change  Interpretation:     Interpretation: normal    Rate:     ECG rate:  114    ECG rate assessment: tachycardic    Rhythm:     Rhythm: sinus tachycardia    Ectopy:     Ectopy: none    QRS:     QRS axis:  Normal  Conduction:     Conduction: normal    ST segments:     ST segments:  Normal  T waves:     T waves: normal               ED Course           MDM  Number of Diagnoses or Management Options  Shortness of breath  Diagnosis management comments: 49-year-old female with past medical history pertinent for hysterectomy, cholecystectomy, , appendectomy who presents to the emergency department for chest pressure that started this morning while sitting at desk, exertional shortness breath that started today as well and cough for the last 3 weeks  Vital signs on arrival here were notable for heart rate in the 120s  Otherwise patient was satting at 100%  Exam was as above  EKG obtained on arrival did not show any acute signs of ischemia  Patient had lab work, viral panel obtained and chest x-ray ordered  Patient is pending results at this time  Patient was given Tylenol for supportive care and patient was signed out to the next attending physician, Dr Roxanna Washington, at 1900  Patient understand plan of care thus far  Amount and/or Complexity of Data Reviewed  Clinical lab tests: ordered  Tests in the medicine section of CPT®: ordered  Decide to obtain previous medical records or to obtain history from someone other than the patient: yes  Review and summarize past medical records: yes    Risk of Complications, Morbidity, and/or Mortality  Presenting problems: moderate  Diagnostic procedures: moderate  Management options: moderate        Disposition  Final diagnoses:   Shortness of breath     Time reflects when diagnosis was documented in both MDM as applicable and the Disposition within this note     Time User Action Codes Description Comment    2022  6:41 PM Devin Puentes Add [R06 02] Shortness of breath       ED Disposition     None      Follow-up Information    None         Patient's Medications   Discharge Prescriptions    No medications on file       No discharge procedures on file      PDMP Review     None          ED Provider  Electronically Signed by           Carrie Garcia MD  22 1900

## 2022-05-30 LAB
ATRIAL RATE: 114 BPM
P AXIS: 27 DEGREES
PR INTERVAL: 132 MS
QRS AXIS: 3 DEGREES
QRSD INTERVAL: 70 MS
QT INTERVAL: 326 MS
QTC INTERVAL: 449 MS
T WAVE AXIS: 6 DEGREES
VENTRICULAR RATE: 114 BPM

## 2022-11-23 ENCOUNTER — HOSPITAL ENCOUNTER (EMERGENCY)
Facility: HOSPITAL | Age: 47
Discharge: HOME | End: 2022-11-24
Attending: EMERGENCY MEDICINE

## 2022-11-23 ENCOUNTER — APPOINTMENT (EMERGENCY)
Dept: CT IMAGING | Facility: HOSPITAL | Age: 47
End: 2022-11-23

## 2022-11-23 DIAGNOSIS — U07.1 COVID-19: Primary | ICD-10-CM

## 2022-11-23 DIAGNOSIS — R07.81 RIB PAIN: ICD-10-CM

## 2022-11-23 LAB
ALBUMIN SERPL BCP-MCNC: 3.4 G/DL (ref 3.5–5)
ALP SERPL-CCNC: 102 U/L (ref 46–116)
ALT SERPL W P-5'-P-CCNC: 24 U/L (ref 12–78)
ANION GAP SERPL CALCULATED.3IONS-SCNC: 7 MMOL/L (ref 4–13)
APTT PPP: 28 SECONDS (ref 23–37)
AST SERPL W P-5'-P-CCNC: 19 U/L (ref 5–45)
BASOPHILS # BLD AUTO: 0.05 THOUSANDS/ÂΜL (ref 0–0.1)
BASOPHILS NFR BLD AUTO: 0 % (ref 0–1)
BILIRUB SERPL-MCNC: 0.27 MG/DL (ref 0.2–1)
BUN SERPL-MCNC: 9 MG/DL (ref 5–25)
CALCIUM ALBUM COR SERPL-MCNC: 9.5 MG/DL (ref 8.3–10.1)
CALCIUM SERPL-MCNC: 9 MG/DL (ref 8.3–10.1)
CARDIAC TROPONIN I PNL SERPL HS: 2 NG/L
CHLORIDE SERPL-SCNC: 101 MMOL/L (ref 96–108)
CO2 SERPL-SCNC: 29 MMOL/L (ref 21–32)
CREAT SERPL-MCNC: 0.88 MG/DL (ref 0.6–1.3)
EOSINOPHIL # BLD AUTO: 0.31 THOUSAND/ÂΜL (ref 0–0.61)
EOSINOPHIL NFR BLD AUTO: 2 % (ref 0–6)
ERYTHROCYTE [DISTWIDTH] IN BLOOD BY AUTOMATED COUNT: 13.4 % (ref 11.6–15.1)
GFR SERPL CREATININE-BSD FRML MDRD: 77 ML/MIN/1.73SQ M
GLUCOSE SERPL-MCNC: 95 MG/DL (ref 65–140)
HCT VFR BLD AUTO: 44.7 % (ref 34.8–46.1)
HGB BLD-MCNC: 14.6 G/DL (ref 11.5–15.4)
IMM GRANULOCYTES # BLD AUTO: 0.08 THOUSAND/UL (ref 0–0.2)
IMM GRANULOCYTES NFR BLD AUTO: 1 % (ref 0–2)
INR PPP: 0.96 (ref 0.84–1.19)
LYMPHOCYTES # BLD AUTO: 3.42 THOUSANDS/ÂΜL (ref 0.6–4.47)
LYMPHOCYTES NFR BLD AUTO: 26 % (ref 14–44)
MCH RBC QN AUTO: 30.5 PG (ref 26.8–34.3)
MCHC RBC AUTO-ENTMCNC: 32.7 G/DL (ref 31.4–37.4)
MCV RBC AUTO: 93 FL (ref 82–98)
MONOCYTES # BLD AUTO: 1.06 THOUSAND/ÂΜL (ref 0.17–1.22)
MONOCYTES NFR BLD AUTO: 8 % (ref 4–12)
NEUTROPHILS # BLD AUTO: 8.11 THOUSANDS/ÂΜL (ref 1.85–7.62)
NEUTS SEG NFR BLD AUTO: 63 % (ref 43–75)
NRBC BLD AUTO-RTO: 0 /100 WBCS
PLATELET # BLD AUTO: 279 THOUSANDS/UL (ref 149–390)
PMV BLD AUTO: 9.9 FL (ref 8.9–12.7)
POTASSIUM SERPL-SCNC: 3.8 MMOL/L (ref 3.5–5.3)
PROT SERPL-MCNC: 7.8 G/DL (ref 6.4–8.4)
PROTHROMBIN TIME: 12.9 SECONDS (ref 11.6–14.5)
RBC # BLD AUTO: 4.79 MILLION/UL (ref 3.81–5.12)
SODIUM SERPL-SCNC: 137 MMOL/L (ref 135–147)
WBC # BLD AUTO: 13.03 THOUSAND/UL (ref 4.31–10.16)

## 2022-11-23 RX ORDER — PREDNISONE 10 MG/1
50 TABLET ORAL DAILY
Qty: 25 TABLET | Refills: 0 | Status: SHIPPED | OUTPATIENT
Start: 2022-11-23 | End: 2022-11-28

## 2022-11-23 RX ORDER — FLUTICASONE PROPIONATE 50 MCG
1 SPRAY, SUSPENSION (ML) NASAL DAILY
COMMUNITY

## 2022-11-23 RX ORDER — ALBUTEROL SULFATE 90 UG/1
2 AEROSOL, METERED RESPIRATORY (INHALATION) ONCE
Status: COMPLETED | OUTPATIENT
Start: 2022-11-24 | End: 2022-11-24

## 2022-11-23 RX ORDER — PREDNISONE 20 MG/1
60 TABLET ORAL ONCE
Status: COMPLETED | OUTPATIENT
Start: 2022-11-24 | End: 2022-11-24

## 2022-11-23 RX ADMIN — IOHEXOL 100 ML: 350 INJECTION, SOLUTION INTRAVENOUS at 23:25

## 2022-11-23 NOTE — Clinical Note
Shira Pickens was seen and treated in our emergency department on 11/23/2022  Diagnosis:     Jabari Jones  may return to work on return date  She may return on this date: 11/28/2022         If you have any questions or concerns, please don't hesitate to call        Theodore Louise, DO    ______________________________           _______________          _______________  Hospital Representative                              Date                                Time

## 2022-11-24 VITALS
BODY MASS INDEX: 44.15 KG/M2 | TEMPERATURE: 98 F | HEIGHT: 65 IN | DIASTOLIC BLOOD PRESSURE: 74 MMHG | WEIGHT: 265 LBS | OXYGEN SATURATION: 97 % | RESPIRATION RATE: 20 BRPM | SYSTOLIC BLOOD PRESSURE: 130 MMHG | HEART RATE: 101 BPM

## 2022-11-24 RX ADMIN — ALBUTEROL SULFATE 2 PUFF: 90 AEROSOL, METERED RESPIRATORY (INHALATION) at 00:02

## 2022-11-24 RX ADMIN — PREDNISONE 60 MG: 20 TABLET ORAL at 00:01

## 2022-11-24 NOTE — ED PROVIDER NOTES
History  Chief Complaint   Patient presents with   • Cough     COVID + , reports continued cough, left rib pain and back pain with cough  50year old female describes cough, uri symptoms worse over past week and a half, diagnosed with COVID with frequent coughing and now left rib pain worse with coughing  Able to smoke, but less  No hemoptysis  No sputum has become thicker, was initially dry  Also having bilateral ear discomfort worse on the right  History provided by:  Patient  Cough  Cough characteristics:  Productive  Onset quality:  Gradual  Timing:  Constant  Progression:  Worsening  Chronicity:  New  Context: upper respiratory infection    Relieved by:  Nothing  Worsened by:  Nothing  Associated symptoms: sore throat    Associated symptoms: no fever        Prior to Admission Medications   Prescriptions Last Dose Informant Patient Reported? Taking? albuterol (Ventolin HFA) 90 mcg/act inhaler 2022  No Yes   Sig: Inhale 2 puffs every 4 (four) hours as needed for wheezing   benzonatate (TESSALON PERLES) 100 mg capsule Not Taking  No No   Sig: Take 1 capsule (100 mg total) by mouth every 8 (eight) hours   Patient not taking: Reported on 2022   cefuroxime (CEFTIN) 250 mg tablet Not Taking  Yes No   Sig: Take 250 mg by mouth 2 (two) times a day   Patient not taking: Reported on 2022   fluticasone (FLONASE) 50 mcg/act nasal spray 2022  Yes Yes   Si spray into each nostril daily      Facility-Administered Medications: None       Past Medical History:   Diagnosis Date   • COVID 2022       Past Surgical History:   Procedure Laterality Date   • APPENDECTOMY     •  SECTION      x 3   • CHOLECYSTECTOMY     • HYSTERECTOMY     • KNEE ARTHROSCOPY Bilateral        Family History   Problem Relation Age of Onset   • Diabetes Mother    • Cancer Father      I have reviewed and agree with the history as documented      E-Cigarette/Vaping   • E-Cigarette Use Never User E-Cigarette/Vaping Substances     Social History     Tobacco Use   • Smoking status: Every Day     Packs/day: 0 50     Types: Cigarettes   • Smokeless tobacco: Never   • Tobacco comments:     30 pk yr hx   Vaping Use   • Vaping Use: Never used   Substance Use Topics   • Alcohol use: Yes     Comment: occasionally   • Drug use: Never       Review of Systems   Constitutional: Negative for fever  HENT: Positive for sore throat  Respiratory: Positive for cough  All other systems reviewed and are negative  Physical Exam  Physical Exam  Vitals and nursing note reviewed  Constitutional:       Comments: Pleasant, comfortable-appearing   HENT:      Head: Normocephalic and atraumatic  Ears:      Comments: Bilateral effusions     Mouth/Throat:      Mouth: Mucous membranes are moist       Pharynx: Oropharynx is clear  Posterior oropharyngeal erythema present  Eyes:      Conjunctiva/sclera: Conjunctivae normal       Pupils: Pupils are equal, round, and reactive to light  Cardiovascular:      Rate and Rhythm: Normal rate and regular rhythm  Heart sounds: Normal heart sounds  Pulmonary:      Effort: Pulmonary effort is normal       Breath sounds: Rhonchi present  Abdominal:      General: Bowel sounds are normal  There is no distension  Palpations: Abdomen is soft  Tenderness: There is no abdominal tenderness  Musculoskeletal:         General: No deformity  Cervical back: Neck supple  Right lower leg: No edema  Left lower leg: No edema  Skin:     General: Skin is warm and dry  Neurological:      General: No focal deficit present  Mental Status: She is alert and oriented to person, place, and time  Cranial Nerves: No cranial nerve deficit  Coordination: Coordination normal    Psychiatric:         Behavior: Behavior normal          Thought Content:  Thought content normal          Judgment: Judgment normal          Vital Signs  ED Triage Vitals [11/23/22 2213]   Temperature Pulse Respirations Blood Pressure SpO2   98 °F (36 7 °C) 101 20 141/80 98 %      Temp Source Heart Rate Source Patient Position - Orthostatic VS BP Location FiO2 (%)   Temporal Monitor Sitting Left arm --      Pain Score       5           Vitals:    11/23/22 2213 11/24/22 0000   BP: 141/80 130/74   Pulse: 101    Patient Position - Orthostatic VS: Sitting          Visual Acuity      ED Medications  Medications   iohexol (OMNIPAQUE) 350 MG/ML injection (SINGLE-DOSE) 100 mL (100 mL Intravenous Given 11/23/22 2325)   albuterol (PROVENTIL HFA,VENTOLIN HFA) inhaler 2 puff (2 puffs Inhalation Given 11/24/22 0002)   predniSONE tablet 60 mg (60 mg Oral Given 11/24/22 0001)       Diagnostic Studies  Results Reviewed     Procedure Component Value Units Date/Time    HS Troponin 0hr (reflex protocol) [[id number]]  (Normal) Collected: 11/23/22 2257    Lab Status: Final result Specimen: Blood from Arm, Right Updated: 11/23/22 2328     hs TnI 0hr 2 ng/L     Comprehensive metabolic panel [[id number]]  (Abnormal) Collected: 11/23/22 2257    Lab Status: Final result Specimen: Blood from Arm, Right Updated: 11/23/22 2321     Sodium 137 mmol/L      Potassium 3 8 mmol/L      Chloride 101 mmol/L      CO2 29 mmol/L      ANION GAP 7 mmol/L      BUN 9 mg/dL      Creatinine 0 88 mg/dL      Glucose 95 mg/dL      Calcium 9 0 mg/dL      Corrected Calcium 9 5 mg/dL      AST 19 U/L      ALT 24 U/L      Alkaline Phosphatase 102 U/L      Total Protein 7 8 g/dL      Albumin 3 4 g/dL      Total Bilirubin 0 27 mg/dL      eGFR 77 ml/min/1 73sq m     Narrative:      Jimena guidelines for Chronic Kidney Disease (CKD):   •  Stage 1 with normal or high GFR (GFR > 90 mL/min/1 73 square meters)  •  Stage 2 Mild CKD (GFR = 60-89 mL/min/1 73 square meters)  •  Stage 3A Moderate CKD (GFR = 45-59 mL/min/1 73 square meters)  •  Stage 3B Moderate CKD (GFR = 30-44 mL/min/1 73 square meters)  •  Stage 4 Severe CKD (GFR = 15-29 mL/min/1 73 square meters)  •  Stage 5 End Stage CKD (GFR <15 mL/min/1 73 square meters)  Note: GFR calculation is accurate only with a steady state creatinine    Protime-INR [[id number]]  (Normal) Collected: 11/23/22 2257    Lab Status: Final result Specimen: Blood from Arm, Right Updated: 11/23/22 2316     Protime 12 9 seconds      INR 0 96    APTT [[id number]]  (Normal) Collected: 11/23/22 2257    Lab Status: Final result Specimen: Blood from Arm, Right Updated: 11/23/22 2316     PTT 28 seconds     CBC and differential [[id number]]  (Abnormal) Collected: 11/23/22 2257    Lab Status: Final result Specimen: Blood from Arm, Right Updated: 11/23/22 2309     WBC 13 03 Thousand/uL      RBC 4 79 Million/uL      Hemoglobin 14 6 g/dL      Hematocrit 44 7 %      MCV 93 fL      MCH 30 5 pg      MCHC 32 7 g/dL      RDW 13 4 %      MPV 9 9 fL      Platelets 234 Thousands/uL      nRBC 0 /100 WBCs      Neutrophils Relative 63 %      Immat GRANS % 1 %      Lymphocytes Relative 26 %      Monocytes Relative 8 %      Eosinophils Relative 2 %      Basophils Relative 0 %      Neutrophils Absolute 8 11 Thousands/µL      Immature Grans Absolute 0 08 Thousand/uL      Lymphocytes Absolute 3 42 Thousands/µL      Monocytes Absolute 1 06 Thousand/µL      Eosinophils Absolute 0 31 Thousand/µL      Basophils Absolute 0 05 Thousands/µL                  CTA ED chest PE study   Final Result by Amado Nur MD (11/23 2337)      No evidence of pulmonary embolus                  Workstation performed: [id number]                    Procedures  ECG 12 Lead Documentation Only    Date/Time: 11/24/2022 12:50 AM  Performed by: Nelson Mike DO  Authorized by: Nelson Mike DO     Indications / Diagnosis:  URI  ECG reviewed by me, the ED Provider: yes    Patient location:  ED  Interpretation:     Interpretation: normal    Rate:     ECG rate:  101    ECG rate assessment: normal    Rhythm:     Rhythm: sinus rhythm    Ectopy:     Ectopy: none QRS:     QRS axis:  Normal  Conduction:     Conduction: normal    ST segments:     ST segments:  Normal  T waves:     T waves: normal               ED Course  ED Course as of 11/24/22 0051   Wed Nov 23, 2022   2348 hs TnI 0hr: 2   2348 WBC(!): 13 03   2355 Remains stable for close outpatient follow up, agrees to return immediately if worse or any new symptoms  MDM    Disposition  Final diagnoses:   COVID-19   Rib pain     Time reflects when diagnosis was documented in both MDM as applicable and the Disposition within this note     Time User Action Codes Description Comment    11/23/2022 11:50 PM Shanique Bob Add [U07 1] COVID-19     11/23/2022 11:51 PM Shanique Bob Modify [U07 1] COVID-19 Rib pain, URI symptoms    11/23/2022 11:51 PM Shanique Bob Modify [U07 1] COVID-19     11/23/2022 11:51 PM Shanique Bob Add [R07 81] Rib pain       ED Disposition     ED Disposition   Discharge    Condition   Stable    Date/Time   Wed Nov 23, 2022 11:50 PM    Comment   Frederic Del Real discharge to home/self care                 Follow-up Information     Follow up With Specialties Details Why Contact Info    Jazz De Paz DO Family Medicine Schedule an appointment as soon as possible for a visit in 1 week  [address] [facility] [place]  [phone]            Discharge Medication List as of 11/23/2022 11:53 PM      CONTINUE these medications which have NOT CHANGED    Details   albuterol (Ventolin HFA) 90 mcg/act inhaler Inhale 2 puffs every 4 (four) hours as needed for wheezing, Starting Sat 5/28/2022, Normal      fluticasone (FLONASE) 50 mcg/act nasal spray 1 spray into each nostril daily, Historical Med      benzonatate (TESSALON PERLES) 100 mg capsule Take 1 capsule (100 mg total) by mouth every 8 (eight) hours, Starting Sat 5/28/2022, Normal      cefuroxime (CEFTIN) 250 mg tablet Take 250 mg by mouth 2 (two) times a day, Starting Wed 3/23/2022, Historical Med             No discharge procedures on file      PDMP Review     None          ED Provider  Electronically Signed by           Tadeo Last, DO  11/23/22 [address] [facility], DO  11/23/22 [address] [facility], DO  11/23/22 [address] [facility], DO  11/24/22 3218

## 2022-11-24 NOTE — DISCHARGE INSTRUCTIONS
Return immediately if worse or any new symptoms  Tylenol 1000 mg every 6 hours as needed  and/or  Advil 400 mg every 6 hours as needed  May take both together  Albuterol inhaler with spacer 2-3 inhalations every 3-4 hours for 3 days and then as needed

## 2022-11-28 LAB
ATRIAL RATE: 101 BPM
P AXIS: 39 DEGREES
PR INTERVAL: 122 MS
QRS AXIS: 36 DEGREES
QRSD INTERVAL: 80 MS
QT INTERVAL: 354 MS
QTC INTERVAL: 459 MS
T WAVE AXIS: 18 DEGREES
VENTRICULAR RATE: 101 BPM

## 2023-03-17 ENCOUNTER — APPOINTMENT (EMERGENCY)
Dept: CT IMAGING | Facility: HOSPITAL | Age: 48
End: 2023-03-17

## 2023-03-17 ENCOUNTER — HOSPITAL ENCOUNTER (EMERGENCY)
Facility: HOSPITAL | Age: 48
Discharge: OTHER INSTITUTION | End: 2023-03-18
Attending: STUDENT IN AN ORGANIZED HEALTH CARE EDUCATION/TRAINING PROGRAM

## 2023-03-17 DIAGNOSIS — K57.20 DIVERTICULITIS OF COLON WITH PERFORATION: Primary | ICD-10-CM

## 2023-03-17 LAB
ACANTHOCYTES BLD QL SMEAR: PRESENT
ALBUMIN SERPL BCP-MCNC: 3.7 G/DL (ref 3.5–5)
ALP SERPL-CCNC: 75 U/L (ref 34–104)
ALT SERPL W P-5'-P-CCNC: 9 U/L (ref 7–52)
ANION GAP SERPL CALCULATED.3IONS-SCNC: 7 MMOL/L (ref 4–13)
AST SERPL W P-5'-P-CCNC: 11 U/L (ref 13–39)
BASOPHILS # BLD MANUAL: 0 THOUSAND/UL (ref 0–0.1)
BASOPHILS NFR MAR MANUAL: 0 % (ref 0–1)
BILIRUB SERPL-MCNC: 0.35 MG/DL (ref 0.2–1)
BILIRUB UR QL STRIP: NEGATIVE
BUN SERPL-MCNC: 7 MG/DL (ref 5–25)
CALCIUM SERPL-MCNC: 8.6 MG/DL (ref 8.4–10.2)
CHLORIDE SERPL-SCNC: 104 MMOL/L (ref 96–108)
CLARITY UR: CLEAR
CO2 SERPL-SCNC: 26 MMOL/L (ref 21–32)
COLOR UR: YELLOW
CREAT SERPL-MCNC: 0.76 MG/DL (ref 0.6–1.3)
EOSINOPHIL # BLD MANUAL: 0.27 THOUSAND/UL (ref 0–0.4)
EOSINOPHIL NFR BLD MANUAL: 2 % (ref 0–6)
ERYTHROCYTE [DISTWIDTH] IN BLOOD BY AUTOMATED COUNT: 13.2 % (ref 11.6–15.1)
GFR SERPL CREATININE-BSD FRML MDRD: 91 ML/MIN/1.73SQ M
GLUCOSE SERPL-MCNC: 101 MG/DL (ref 65–140)
GLUCOSE UR STRIP-MCNC: NEGATIVE MG/DL
HCT VFR BLD AUTO: 41.8 % (ref 34.8–46.1)
HGB BLD-MCNC: 13.7 G/DL (ref 11.5–15.4)
HGB UR QL STRIP.AUTO: NEGATIVE
KETONES UR STRIP-MCNC: NEGATIVE MG/DL
LEUKOCYTE ESTERASE UR QL STRIP: NEGATIVE
LG PLATELETS BLD QL SMEAR: PRESENT
LYMPHOCYTES # BLD AUTO: 24 % (ref 14–44)
LYMPHOCYTES # BLD AUTO: 3.29 THOUSAND/UL (ref 0.6–4.47)
MAGNESIUM SERPL-MCNC: 2.1 MG/DL (ref 1.9–2.7)
MCH RBC QN AUTO: 30.4 PG (ref 26.8–34.3)
MCHC RBC AUTO-ENTMCNC: 32.8 G/DL (ref 31.4–37.4)
MCV RBC AUTO: 93 FL (ref 82–98)
MONOCYTES # BLD AUTO: 0.82 THOUSAND/UL (ref 0–1.22)
MONOCYTES NFR BLD: 6 % (ref 4–12)
NEUTROPHILS # BLD MANUAL: 9.32 THOUSAND/UL (ref 1.85–7.62)
NEUTS SEG NFR BLD AUTO: 68 % (ref 43–75)
NITRITE UR QL STRIP: NEGATIVE
PH UR STRIP.AUTO: 6.5 [PH]
PLATELET # BLD AUTO: 124 THOUSANDS/UL (ref 149–390)
PLATELET BLD QL SMEAR: ABNORMAL
PLATELET CLUMP BLD QL SMEAR: PRESENT
POIKILOCYTOSIS BLD QL SMEAR: PRESENT
POTASSIUM SERPL-SCNC: 3.6 MMOL/L (ref 3.5–5.3)
PROT SERPL-MCNC: 6.8 G/DL (ref 6.4–8.4)
PROT UR STRIP-MCNC: NEGATIVE MG/DL
RBC # BLD AUTO: 4.51 MILLION/UL (ref 3.81–5.12)
RBC MORPH BLD: PRESENT
SODIUM SERPL-SCNC: 137 MMOL/L (ref 135–147)
SP GR UR STRIP.AUTO: 1.01 (ref 1–1.03)
UROBILINOGEN UR QL STRIP.AUTO: 0.2 E.U./DL
WBC # BLD AUTO: 13.71 THOUSAND/UL (ref 4.31–10.16)

## 2023-03-17 RX ORDER — KETOROLAC TROMETHAMINE 30 MG/ML
15 INJECTION, SOLUTION INTRAMUSCULAR; INTRAVENOUS ONCE
Status: COMPLETED | OUTPATIENT
Start: 2023-03-17 | End: 2023-03-17

## 2023-03-17 RX ORDER — ONDANSETRON 2 MG/ML
4 INJECTION INTRAMUSCULAR; INTRAVENOUS ONCE
Status: COMPLETED | OUTPATIENT
Start: 2023-03-17 | End: 2023-03-17

## 2023-03-17 RX ORDER — SODIUM CHLORIDE, SODIUM GLUCONATE, SODIUM ACETATE, POTASSIUM CHLORIDE, MAGNESIUM CHLORIDE, SODIUM PHOSPHATE, DIBASIC, AND POTASSIUM PHOSPHATE .53; .5; .37; .037; .03; .012; .00082 G/100ML; G/100ML; G/100ML; G/100ML; G/100ML; G/100ML; G/100ML
1000 INJECTION, SOLUTION INTRAVENOUS ONCE
Status: COMPLETED | OUTPATIENT
Start: 2023-03-17 | End: 2023-03-18

## 2023-03-17 RX ADMIN — KETOROLAC TROMETHAMINE 15 MG: 30 INJECTION, SOLUTION INTRAMUSCULAR; INTRAVENOUS at 20:27

## 2023-03-17 RX ADMIN — SODIUM CHLORIDE, SODIUM GLUCONATE, SODIUM ACETATE, POTASSIUM CHLORIDE, MAGNESIUM CHLORIDE, SODIUM PHOSPHATE, DIBASIC, AND POTASSIUM PHOSPHATE 1000 ML: .53; .5; .37; .037; .03; .012; .00082 INJECTION, SOLUTION INTRAVENOUS at 20:17

## 2023-03-17 RX ADMIN — ONDANSETRON 4 MG: 2 INJECTION INTRAMUSCULAR; INTRAVENOUS at 20:24

## 2023-03-17 RX ADMIN — IOHEXOL 99 ML: 300 INJECTION, SOLUTION INTRAVENOUS at 22:21

## 2023-03-17 NOTE — Clinical Note
Trinity Ramirez accompanied Ricky Chen to the emergency department on 3/17/2023  Return date if applicable: 36/78/6781        If you have any questions or concerns, please don't hesitate to call        Gael Stanley MD

## 2023-03-18 ENCOUNTER — HOSPITAL ENCOUNTER (INPATIENT)
Facility: HOSPITAL | Age: 48
LOS: 1 days | Discharge: HOME | End: 2023-03-19
Attending: SURGERY | Admitting: SURGERY

## 2023-03-18 VITALS
BODY MASS INDEX: 44.93 KG/M2 | SYSTOLIC BLOOD PRESSURE: 105 MMHG | HEART RATE: 79 BPM | TEMPERATURE: 98.1 F | RESPIRATION RATE: 18 BRPM | OXYGEN SATURATION: 99 % | WEIGHT: 270 LBS | DIASTOLIC BLOOD PRESSURE: 64 MMHG

## 2023-03-18 DIAGNOSIS — K57.92 DIVERTICULITIS: Primary | ICD-10-CM

## 2023-03-18 LAB
ANION GAP SERPL CALCULATED.3IONS-SCNC: 6 MMOL/L (ref 4–13)
BASOPHILS # BLD AUTO: 0.04 THOUSANDS/ÂΜL (ref 0–0.1)
BASOPHILS NFR BLD AUTO: 0 % (ref 0–1)
BUN SERPL-MCNC: 6 MG/DL (ref 5–25)
CALCIUM SERPL-MCNC: 8.7 MG/DL (ref 8.4–10.2)
CHLORIDE SERPL-SCNC: 106 MMOL/L (ref 96–108)
CO2 SERPL-SCNC: 26 MMOL/L (ref 21–32)
CREAT SERPL-MCNC: 0.77 MG/DL (ref 0.6–1.3)
EOSINOPHIL # BLD AUTO: 0.21 THOUSAND/ÂΜL (ref 0–0.61)
EOSINOPHIL NFR BLD AUTO: 2 % (ref 0–6)
ERYTHROCYTE [DISTWIDTH] IN BLOOD BY AUTOMATED COUNT: 12.3 % (ref 11.6–15.1)
GFR SERPL CREATININE-BSD FRML MDRD: 90 ML/MIN/1.73SQ M
GLUCOSE SERPL-MCNC: 94 MG/DL (ref 65–140)
HCT VFR BLD AUTO: 38.1 % (ref 34.8–46.1)
HGB BLD-MCNC: 12.5 G/DL (ref 11.5–15.4)
IMM GRANULOCYTES # BLD AUTO: 0.04 THOUSAND/UL (ref 0–0.2)
IMM GRANULOCYTES NFR BLD AUTO: 0 % (ref 0–2)
LYMPHOCYTES # BLD AUTO: 2.62 THOUSANDS/ÂΜL (ref 0.6–4.47)
LYMPHOCYTES NFR BLD AUTO: 25 % (ref 14–44)
MCH RBC QN AUTO: 30.6 PG (ref 26.8–34.3)
MCHC RBC AUTO-ENTMCNC: 32.8 G/DL (ref 31.4–37.4)
MCV RBC AUTO: 93 FL (ref 82–98)
MONOCYTES # BLD AUTO: 0.98 THOUSAND/ÂΜL (ref 0.17–1.22)
MONOCYTES NFR BLD AUTO: 9 % (ref 4–12)
NEUTROPHILS # BLD AUTO: 6.77 THOUSANDS/ÂΜL (ref 1.85–7.62)
NEUTS SEG NFR BLD AUTO: 64 % (ref 43–75)
NRBC BLD AUTO-RTO: 0 /100 WBCS
PLATELET # BLD AUTO: 238 THOUSANDS/UL (ref 149–390)
PMV BLD AUTO: 10.1 FL (ref 8.9–12.7)
POTASSIUM SERPL-SCNC: 3.7 MMOL/L (ref 3.5–5.3)
RBC # BLD AUTO: 4.09 MILLION/UL (ref 3.81–5.12)
SODIUM SERPL-SCNC: 138 MMOL/L (ref 135–147)
WBC # BLD AUTO: 10.66 THOUSAND/UL (ref 4.31–10.16)

## 2023-03-18 RX ORDER — CEFTRIAXONE 1 G/50ML
1000 INJECTION, SOLUTION INTRAVENOUS EVERY 24 HOURS
Status: DISCONTINUED | OUTPATIENT
Start: 2023-03-18 | End: 2023-03-19

## 2023-03-18 RX ORDER — HYDROMORPHONE HCL IN WATER/PF 6 MG/30 ML
0.2 PATIENT CONTROLLED ANALGESIA SYRINGE INTRAVENOUS EVERY 4 HOURS PRN
Status: DISCONTINUED | OUTPATIENT
Start: 2023-03-18 | End: 2023-03-19 | Stop reason: HOSPADM

## 2023-03-18 RX ORDER — HEPARIN SODIUM 5000 [USP'U]/ML
5000 INJECTION, SOLUTION INTRAVENOUS; SUBCUTANEOUS EVERY 8 HOURS SCHEDULED
Status: DISCONTINUED | OUTPATIENT
Start: 2023-03-18 | End: 2023-03-18

## 2023-03-18 RX ORDER — SODIUM CHLORIDE, SODIUM LACTATE, POTASSIUM CHLORIDE, CALCIUM CHLORIDE 600; 310; 30; 20 MG/100ML; MG/100ML; MG/100ML; MG/100ML
125 INJECTION, SOLUTION INTRAVENOUS CONTINUOUS
Status: DISCONTINUED | OUTPATIENT
Start: 2023-03-18 | End: 2023-03-18

## 2023-03-18 RX ORDER — METRONIDAZOLE 500 MG/100ML
500 INJECTION, SOLUTION INTRAVENOUS ONCE
Status: COMPLETED | OUTPATIENT
Start: 2023-03-18 | End: 2023-03-18

## 2023-03-18 RX ORDER — DEXTROSE, SODIUM CHLORIDE, AND POTASSIUM CHLORIDE 5; .45; .15 G/100ML; G/100ML; G/100ML
75 INJECTION INTRAVENOUS CONTINUOUS
Status: DISCONTINUED | OUTPATIENT
Start: 2023-03-18 | End: 2023-03-19

## 2023-03-18 RX ORDER — METRONIDAZOLE 500 MG/100ML
500 INJECTION, SOLUTION INTRAVENOUS EVERY 8 HOURS
Status: DISCONTINUED | OUTPATIENT
Start: 2023-03-18 | End: 2023-03-19 | Stop reason: HOSPADM

## 2023-03-18 RX ORDER — OXYCODONE HYDROCHLORIDE 5 MG/1
5 TABLET ORAL EVERY 4 HOURS PRN
Status: DISCONTINUED | OUTPATIENT
Start: 2023-03-18 | End: 2023-03-19 | Stop reason: HOSPADM

## 2023-03-18 RX ORDER — ACETAMINOPHEN 325 MG/1
650 TABLET ORAL EVERY 6 HOURS PRN
Status: DISCONTINUED | OUTPATIENT
Start: 2023-03-18 | End: 2023-03-19 | Stop reason: HOSPADM

## 2023-03-18 RX ORDER — HEPARIN SODIUM 5000 [USP'U]/ML
7500 INJECTION, SOLUTION INTRAVENOUS; SUBCUTANEOUS EVERY 8 HOURS SCHEDULED
Status: DISCONTINUED | OUTPATIENT
Start: 2023-03-18 | End: 2023-03-19 | Stop reason: HOSPADM

## 2023-03-18 RX ORDER — ONDANSETRON 2 MG/ML
4 INJECTION INTRAMUSCULAR; INTRAVENOUS EVERY 4 HOURS PRN
Status: DISCONTINUED | OUTPATIENT
Start: 2023-03-18 | End: 2023-03-19 | Stop reason: HOSPADM

## 2023-03-18 RX ORDER — CEFTRIAXONE 1 G/50ML
1000 INJECTION, SOLUTION INTRAVENOUS ONCE
Status: COMPLETED | OUTPATIENT
Start: 2023-03-18 | End: 2023-03-18

## 2023-03-18 RX ADMIN — DEXTROSE, SODIUM CHLORIDE, AND POTASSIUM CHLORIDE 75 ML/HR: 5; .45; .15 INJECTION INTRAVENOUS at 23:18

## 2023-03-18 RX ADMIN — METRONIDAZOLE 500 MG: 500 INJECTION, SOLUTION INTRAVENOUS at 00:35

## 2023-03-18 RX ADMIN — SODIUM CHLORIDE, SODIUM LACTATE, POTASSIUM CHLORIDE, AND CALCIUM CHLORIDE 125 ML/HR: .6; .31; .03; .02 INJECTION, SOLUTION INTRAVENOUS at 08:30

## 2023-03-18 RX ADMIN — ACETAMINOPHEN 325MG 650 MG: 325 TABLET ORAL at 10:53

## 2023-03-18 RX ADMIN — ONDANSETRON HYDROCHLORIDE 4 MG: 2 SOLUTION INTRAMUSCULAR; INTRAVENOUS at 21:12

## 2023-03-18 RX ADMIN — HEPARIN SODIUM 7500 UNITS: 5000 INJECTION INTRAVENOUS; SUBCUTANEOUS at 08:05

## 2023-03-18 RX ADMIN — METRONIDAZOLE 500 MG: 500 INJECTION, SOLUTION INTRAVENOUS at 08:30

## 2023-03-18 RX ADMIN — CEFTRIAXONE 1000 MG: 1 INJECTION, SOLUTION INTRAVENOUS at 23:10

## 2023-03-18 RX ADMIN — METRONIDAZOLE 500 MG: 500 INJECTION, SOLUTION INTRAVENOUS at 18:32

## 2023-03-18 RX ADMIN — CEFTRIAXONE 1000 MG: 1 INJECTION, SOLUTION INTRAVENOUS at 00:11

## 2023-03-18 NOTE — MALNUTRITION/BMI
This medical record reflects one or more clinical indicators suggestive of malnutrition and/or morbid obesity  Malnutrition Findings:                                 BMI Findings:  Adult BMI Classifications: Morbid Obesity 40-44 9        Body mass index is 44 93 kg/m²  See Nutrition note dated 03/18/2023 for additional details  Completed nutrition assessment is viewable in the nutrition documentation

## 2023-03-18 NOTE — EMTALA/ACUTE CARE TRANSFER
[address] [place] [address]  [facility] [address]  Dept: [phone]      EMTALA TRANSFER CONSENT    NAME Shanti Posey                                         1975                              MRN [id number]    I have been informed of my rights regarding examination, treatment, and transfer   by Dr Nataly Finch MD    Benefits: Specialized equipment and/or services available at the receiving facility (Include comment)________________________    Risks: Potential for delay in receiving treatment, Potential deterioration of medical condition, Loss of IV, Increased discomfort during transfer, Possible worsening of condition or death during transfer      Consent for Transfer:  I acknowledge that my medical condition has been evaluated and explained to me by the emergency department physician or other qualified medical person and/or my attending physician, who has recommended that I be transferred to the service of  Accepting Physician: Dave at [address] Name, [address] : [place]  The above potential benefits of such transfer, the potential risks associated with such transfer, and the probable risks of not being transferred have been explained to me, and I fully understand them  The doctor has explained that, in my case, the benefits of transfer outweigh the risks  I agree to be transferred  I authorize the performance of emergency medical procedures and treatments upon me in both transit and upon arrival at the receiving facility  Additionally, I authorize the release of any and all medical records to the receiving facility and request they be transported with me, if possible  I understand that the safest mode of transportation during a medical emergency is an ambulance and that the Hospital advocates the use of this mode of transport   Risks of traveling to the receiving facility by car, including absence of medical control, life sustaining equipment, such as oxygen, and medical personnel has been explained to me and I fully understand them  (RACHAEL CORRECT BOX BELOW)  [X]  I consent to the stated transfer and to be transported by ambulance/helicopter  [  ]  I consent to the stated transfer, but refuse transportation by ambulance and accept full responsibility for my transportation by car  I understand the risks of non-ambulance transfers and I exonerate the Hospital and its staff from any deterioration in my condition that results from this refusal     X___________________________________________    DATE  23  TIME________  Signature of patient or legally responsible individual signing on patient behalf           RELATIONSHIP TO PATIENT_________________________          Provider Certification    NAME Matt Metcalf                                         1975                              MRN [id number]    A medical screening exam was performed on the above named patient  Based on the examination:    Condition Necessitating Transfer The encounter diagnosis was Diverticulitis of colon with perforation      Patient Condition: The patient has been stabilized such that within reasonable medical probability, no material deterioration of the patient condition or the condition of the unborn child(kirstin) is likely to result from the transfer    Reason for Transfer: Level of Care needed not available at this facility    Transfer Requirements: [address]   · Space available and qualified personnel available for treatment as acknowledged by Laree Runner  · Agreed to accept transfer and to provide appropriate medical treatment as acknowledged by       Dave  · Appropriate medical records of the examination and treatment of the patient are provided at the time of transfer   [address] _______  · Transfer will be performed by qualified personnel from    and appropriate transfer equipment as required, including the use of necessary and appropriate life support measures  Provider Certification: I have examined the patient and explained the following risks and benefits of being transferred/refusing transfer to the patient/family:  General risk, such as traffic hazards, adverse weather conditions, rough terrain or turbulence, possible failure of equipment (including vehicle or aircraft), or consequences of actions of persons outside the control of the transport personnel, Unanticipated needs of medical equipment and personnel during transport, Risk of worsening condition, The possibility of a transport vehicle being unavailable      Based on these reasonable risks and benefits to the patient and/or the unborn child(kirstin), and based upon the information available at the time of the patient’s examination, I certify that the medical benefits reasonably to be expected from the provision of appropriate medical treatments at another medical facility outweigh the increasing risks, if any, to the individual’s medical condition, and in the case of labor to the unborn child, from effecting the transfer      X____________________________________________ DATE 03/18/23        TIME_______      ORIGINAL - SEND TO MEDICAL RECORDS   COPY - SEND WITH PATIENT DURING TRANSFER

## 2023-03-18 NOTE — ED CARE HANDOFF
Emergency Department Sign Out Note        Sign out and transfer of care from Dr Arline Brady  See Separate Emergency Department note  The patient, Herson Murillo, was evaluated by the previous provider for abdominal pain  Workup Completed:  Complete evaluation was done which revealed complicated diverticulitis with microperforation  ED Course / Workup Pending (followup): Awaiting callback from surgical services at [facility] as there is no on-call surgery available here  Patient has now been accepted by Dr Opal Umana, will transfer to [facility]  Procedures  MDM        Disposition  Final diagnoses:   Diverticulitis of colon with perforation     Time reflects when diagnosis was documented in both MDM as applicable and the Disposition within this note     Time User Action Codes Description Comment    3/18/2023 12:45 AM Rylee Whaley Add [K57 20] Diverticulitis of colon with perforation       ED Disposition     ED Disposition   Transfer to Another Facility-In Network    Condition   --    Date/Time   Sat Mar 18, 2023  1:29 AM    Comment   Herson Murillo should be transferred out to [facility]             MD Documentation    Aneita Prima Most Recent Value   Patient Condition The patient has been stabilized such that within reasonable medical probability, no material deterioration of the patient condition or the condition of the unborn child(kirstin) is likely to result from the transfer   Reason for Transfer Level of Care needed not available at this facility   Benefits of Transfer Specialized equipment and/or services available at the receiving facility (Include comment)________________________   Risks of Transfer Potential for delay in receiving treatment, Potential deterioration of medical condition, Loss of IV, Increased discomfort during transfer, Possible worsening of condition or death during transfer   Accepting Physician Tory Name, [address] [place] [address] [place]    (Name & Tel number) Agustin Kincaid   Sending MD [address]   Provider Certification General risk, such as traffic hazards, adverse weather conditions, rough terrain or turbulence, possible failure of equipment (including vehicle or aircraft), or consequences of actions of persons outside the control of the transport personnel, Unanticipated needs of medical equipment and personnel during transport, Risk of worsening condition, The possibility of a transport vehicle being unavailable      RN Documentation    Flowsheet Row Most [facility] Name, [facility]    (Name & Tel number) Agustin Kincaid      Follow-up Information    None       Patient's Medications   Discharge Prescriptions    No medications on file     No discharge procedures on file         ED Provider  Electronically Signed by     Francisco Cockayne, MD  03/18/23 2052

## 2023-03-18 NOTE — ED PROVIDER NOTES
History  Chief Complaint   Patient presents with   • Abdominal Pain     Pt reports suprapubic pain, increased urinary freq one week ago  Reports pain persists, also reports rectal rressure beginning 5 days ago  Reports diarrhea with mucous        History provided by:  Patient  Abdominal Pain  Pain location:  Suprapubic  Pain quality: gnawing and sharp    Pain radiates to:  Does not radiate  Pain severity:  Moderate  Onset quality:  Gradual  Duration:  5 days  Timing:  Constant  Progression:  Worsening  Chronicity:  New  Relieved by:  Nothing  Worsened by:  Nothing  Ineffective treatments:  None tried  Associated symptoms: anorexia, diarrhea, fatigue, flatus and nausea    Associated symptoms: no chest pain, no chills, no constipation, no cough, no dysuria, no fever, no hematemesis, no hematochezia, no hematuria, no melena, no shortness of breath, no sore throat and no vomiting       24-year-old female  Presents with suprapubic abdominal pain, rectal pressure  She states that earlier in the week, she had lower abdominal pain with associated urinary frequency/urgency  Continues to express lower abdominal pain but noticing mucous in her stool  Denies blood, fever/chills  Of note, she states that approximately 2 weeks ago she was prescribed a course of Augmentin  Oral intake has been decreased  Prior to Admission Medications   Prescriptions Last Dose Informant Patient Reported? Taking?    albuterol (Ventolin HFA) 90 mcg/act inhaler   No No   Sig: Inhale 2 puffs every 4 (four) hours as needed for wheezing   benzonatate (TESSALON PERLES) 100 mg capsule   No No   Sig: Take 1 capsule (100 mg total) by mouth every 8 (eight) hours   Patient not taking: Reported on 2022   cefuroxime (CEFTIN) 250 mg tablet   Yes No   Sig: Take 250 mg by mouth 2 (two) times a day   Patient not taking: Reported on 2022   fluticasone (FLONASE) 50 mcg/act nasal spray   Yes No   Si spray into each nostril daily Facility-Administered Medications: None       Past Medical History:   Diagnosis Date   • COVID 2022       Past Surgical History:   Procedure Laterality Date   • APPENDECTOMY     •  SECTION      x 3   • CHOLECYSTECTOMY     • HYSTERECTOMY     • KNEE ARTHROSCOPY Bilateral        Family History   Problem Relation Age of Onset   • Diabetes Mother    • Cancer Father      I have reviewed and agree with the history as documented  E-Cigarette/Vaping   • E-Cigarette Use Never User      E-Cigarette/Vaping Substances     Social History     Tobacco Use   • Smoking status: Every Day     Packs/day: 0 50     Types: Cigarettes   • Smokeless tobacco: Never   • Tobacco comments:     30 pk yr hx   Vaping Use   • Vaping Use: Never used   Substance Use Topics   • Alcohol use: Yes     Comment: occasionally   • Drug use: Never     Review of Systems   Constitutional: Positive for activity change, appetite change and fatigue  Negative for chills and fever  HENT: Negative for congestion, rhinorrhea, sinus pressure, sinus pain and sore throat  Respiratory: Negative for cough, chest tightness and shortness of breath  Cardiovascular: Negative for chest pain and palpitations  Gastrointestinal: Positive for abdominal pain, anorexia, diarrhea, flatus and nausea  Negative for abdominal distention, constipation, hematemesis, hematochezia, melena and vomiting  Genitourinary: Positive for frequency, pelvic pain and urgency  Negative for decreased urine volume, difficulty urinating, dysuria, flank pain and hematuria  Musculoskeletal: Negative for arthralgias, back pain, myalgias, neck pain and neck stiffness  Skin: Negative for color change, pallor, rash and wound  Neurological: Negative for dizziness, syncope, weakness, light-headedness and headaches  Hematological: Does not bruise/bleed easily  All other systems reviewed and are negative  Physical Exam  Physical Exam  Vitals and nursing note reviewed  Constitutional:       General: She is in acute distress  Appearance: She is not ill-appearing or toxic-appearing  HENT:      Head: Normocephalic and atraumatic  Right Ear: External ear normal       Left Ear: External ear normal       Nose: No congestion or rhinorrhea  Mouth/Throat:      Mouth: Mucous membranes are moist       Pharynx: Oropharynx is clear  No oropharyngeal exudate or posterior oropharyngeal erythema  Eyes:      General: No scleral icterus  Right eye: No discharge  Left eye: No discharge  Extraocular Movements: Extraocular movements intact  Conjunctiva/sclera: Conjunctivae normal       Pupils: Pupils are equal, round, and reactive to light  Cardiovascular:      Rate and Rhythm: Normal rate and regular rhythm  Pulses: Normal pulses  Heart sounds: Normal heart sounds  No murmur heard  Pulmonary:      Effort: Pulmonary effort is normal  No respiratory distress  Breath sounds: Normal breath sounds  No stridor  No wheezing, rhonchi or rales  Chest:      Chest wall: No tenderness  Abdominal:      General: Abdomen is flat  Bowel sounds are normal  There is no distension  Palpations: Abdomen is soft  There is no mass  Tenderness: There is abdominal tenderness in the suprapubic area and left lower quadrant  There is no right CVA tenderness, left CVA tenderness, guarding or rebound  Hernia: No hernia is present  Musculoskeletal:         General: No swelling, tenderness, deformity or signs of injury  Cervical back: Neck supple  No tenderness  Right lower leg: No edema  Left lower leg: No edema  Skin:     General: Skin is warm and dry  Capillary Refill: Capillary refill takes less than 2 seconds  Coloration: Skin is not cyanotic, jaundiced, mottled or pale  Findings: No bruising, erythema or rash  Neurological:      General: No focal deficit present        Mental Status: She is alert and oriented to person, place, and time  Cranial Nerves: No cranial nerve deficit  Sensory: No sensory deficit  Motor: No weakness  Psychiatric:         Mood and Affect: Mood is anxious  Mood is not depressed  Behavior: Behavior normal          Thought Content:  Thought content normal          Judgment: Judgment normal        Vital Signs  ED Triage Vitals [03/17/23 1948]   Temperature Pulse Respirations Blood Pressure SpO2   98 1 °F (36 7 °C) 104 16 165/99 99 %      Temp Source Heart Rate Source Patient Position - Orthostatic VS BP Location FiO2 (%)   Temporal -- Lying Right arm --      Pain Score       7         Vitals:    03/17/23 1948 03/17/23 2030 03/17/23 2330   BP: 165/99 137/85 135/66   Pulse: 104 91 89   Patient Position - Orthostatic VS: Lying       ED Medications  Medications   metroNIDAZOLE (FLAGYL) IVPB (premix) 500 mg 100 mL (500 mg Intravenous New Bag 3/18/23 0035)   multi-electrolyte (ISOLYTE-S PH 7 4) bolus 1,000 mL (1,000 mL Intravenous New Bag 3/17/23 2017)   ondansetron (ZOFRAN) injection 4 mg (4 mg Intravenous Given 3/17/23 2024)   ketorolac (TORADOL) injection 15 mg (15 mg Intravenous Given 3/17/23 2027)   iohexol (OMNIPAQUE) 300 mg/mL injection 99 mL (99 mL Intravenous Given 3/17/23 2221)   cefTRIAXone (ROCEPHIN) IVPB (premix in dextrose) 1,000 mg 50 mL (0 mg Intravenous Stopped 3/18/23 0035)     Diagnostic Studies  Results Reviewed     Procedure Component Value Units Date/Time    Comprehensive metabolic panel [[id number]]  (Abnormal) Collected: 03/17/23 2204    Lab Status: Final result Specimen: Blood from Arm, Right Updated: 03/17/23 2231     Sodium 137 mmol/L      Potassium 3 6 mmol/L      Chloride 104 mmol/L      CO2 26 mmol/L      ANION GAP 7 mmol/L      BUN 7 mg/dL      Creatinine 0 76 mg/dL      Glucose 101 mg/dL      Calcium 8 6 mg/dL      AST 11 U/L      ALT 9 U/L      Alkaline Phosphatase 75 U/L      Total Protein 6 8 g/dL      Albumin 3 7 g/dL      Total Bilirubin 0 35 mg/dL      eGFR 91 ml/min/1 73sq m     Narrative:      [place] guidelines for Chronic Kidney Disease (CKD):   •  Stage 1 with normal or high GFR (GFR > 90 mL/min/1 73 square meters)  •  Stage 2 Mild CKD (GFR = 60-89 mL/min/1 73 square meters)  •  Stage 3A Moderate CKD (GFR = 45-59 mL/min/1 73 square meters)  •  Stage 3B Moderate CKD (GFR = 30-44 mL/min/1 73 square meters)  •  Stage 4 Severe CKD (GFR = 15-29 mL/min/1 73 square meters)  •  Stage 5 End Stage CKD (GFR <15 mL/min/1 73 square meters)  Note: GFR calculation is accurate only with a steady state creatinine    Magnesium [[id number]]  (Normal) Collected: 03/17/23 2204    Lab Status: Final result Specimen: Blood from Arm, Right Updated: 03/17/23 2231     Magnesium 2 1 mg/dL     Manual Differential(PHLEBS Do Not Order) [[id number]]  (Abnormal) Collected: 03/17/23 2051    Lab Status: Final result Specimen: Blood from Arm, Right Updated: 03/17/23 2137     Segmented % 68 %      Lymphocytes % 24 %      Monocytes % 6 %      Eosinophils, % 2 %      Basophils % 0 %      Absolute Neutrophils 9 32 Thousand/uL      Lymphocytes Absolute 3 29 Thousand/uL      Monocytes Absolute 0 82 Thousand/uL      Eosinophils Absolute 0 27 Thousand/uL      Basophils Absolute 0 00 Thousand/uL      Total Counted --     RBC Morphology Present     Acanthocytes Present     Poikilocytes Present     Platelet Estimate Borderline     Clumped Platelets Present     Large Platelet Present    CBC and differential [[id number]]  (Abnormal) Collected: 03/17/23 2051    Lab Status: Final result Specimen: Blood from Arm, Right Updated: 03/17/23 2105     WBC 13 71 Thousand/uL      RBC 4 51 Million/uL      Hemoglobin 13 7 g/dL      Hematocrit 41 8 %      MCV 93 fL      MCH 30 4 pg      MCHC 32 8 g/dL      RDW 13 2 %      Platelets 494 Thousands/uL     UA w Reflex to Microscopic w Reflex to Culture [[id number]] Collected: 03/17/23 2051    Lab Status: Final result Specimen: Urine, Clean Catch Updated: 03/17/23 2058     Color, UA Yellow     Clarity, UA Clear     Specific Gravity, UA 1 010     pH, UA 6 5     Leukocytes, UA Negative     Nitrite, UA Negative     Protein, UA Negative mg/dl      Glucose, UA Negative mg/dl      Ketones, UA Negative mg/dl      Urobilinogen, UA 0 2 E U /dl      Bilirubin, UA Negative     Occult Blood, UA Negative             CT abdomen pelvis with contrast   Final Result by Mauricio Leventhal, MD (03/17 2350)               There is  an inflamed diverticula associated adjacent fluid and fat stranding and colonic mural thickening compatible with diverticulitis  There is a questionable locule of extraluminal air concerning for a contained perforation  I personally discussed this study with Vianca Kidd on 3/17/2023 at 11:50 PM                Workstation performed: [id number]                Procedures  Procedures     ED Course  ED Course as of 03/18/23 0042   Fri Mar 17, 2023   2130 Leukocytosis  Hemoglobin is within normal limits  UA without signs of infection  Sat Mar 18, 2023   0004 CT imaging showing complicated diverticulitis  Being that there is no on-call surgeon's weekend, the patient will have to be transferred  She was offered transfer to either [facility] or [place] [address]  She elected [facility]  Administering a dose of IV Rocephin/Flagyl  SBIRT 22yo+    Flowsheet Row Most Recent Value   SBIRT (23 yo +)    In order to provide better care to our patients, we are screening all of our patients for alcohol and drug use  Would it be okay to ask you these screening questions? Yes Filed at: 03/17/2023 2008   Initial Alcohol Screen: US AUDIT-C     1  How often do you have a drink containing alcohol? 1 Filed at: 03/17/2023 2008   2  How many drinks containing alcohol do you have on a typical day you are drinking? 1 Filed at: 03/17/2023 2008   3a  Male UNDER 65: How often do you have five or more drinks on one occasion?  0 Filed at: 03/17/2023 2008   3b  FEMALE Any Age, or MALE 65+: How often do you have 4 or more drinks on one occassion? 0 Filed at: 03/17/2023 2008   Audit-C Score 2 Filed at: 03/17/2023 2008   SUBHASH: How many times in the past year have you    Used an illegal drug or used a prescription medication for non-medical reasons? Never Filed at: 03/17/2023 2008        Medical Decision Making  The differential diagnoses include but are not limited to UTI, appendicitis, diverticulitis  64-year-old female  Presents with lower abdominal pain, urinary symptoms, changes in stool  Work-up significant for signs of complicated diverticulitis  IV Rocephin/Flagyl administered  Awaiting to speak with General Surgery at [address]  The case was signed out to Dr Linda Rizzo  Plan discussed  Amount and/or Complexity of Data Reviewed  Labs: ordered  Decision-making details documented in ED Course  Radiology: ordered  Risk  Prescription drug management  Disposition  Final diagnoses:   Diverticulitis of colon with perforation     ED Disposition     None      Follow-up Information    None         Patient's Medications   Discharge Prescriptions    No medications on file       No discharge procedures on file      PDMP Review     None          ED Provider  Electronically Signed by           Mary Del Valle DO  03/18/23 0045

## 2023-03-18 NOTE — H&P
H&P Exam - General Surgery   Vlad Pena 46 y o  female MRN: [id number]  Unit/Bed#: E5 -01 Encounter: [id number]    Assessment/Plan     Assessment:  45 yo female w/ acute diverticulitis w/ questionable microperf    AVSS  WBC 13K  Cr-0 88    CTAP-inflamed diverticula associated adjacent fluid and fat stranding and colonic mural thickening compatible with diverticulitis  Questionable microperf  Plan:  -Clear liquid diet  -IV abx Rocephin/flagyl  -Pain/nausea control PRN  -DVT ppx  -monitor abd exam  -Will need outpt c-scope 6-8 weeks after flare    History of Present Illness     HPI:  Vlad Pena is a 46 y o  female who presents with left lower quadrant abdominal pain for one week  Patient reports pain is constant and feels like pressure in her LLQ and pelvis associated with urinary urgency  Associated with gas, worse with bowel movements  Endorses fevers/chills  Never had this pain in the past  No chest pain or shortness of breath  Reports she had a c-scope approximately 5 years ago which was unremarkable  Surgical hx of appendectomy,  and  Cholecystectomy    Review of Systems   Constitutional: Positive for chills and fever  HENT: Negative  Eyes: Negative  Respiratory: Negative  Cardiovascular: Negative  Gastrointestinal: Positive for abdominal pain and rectal pain  Negative for blood in stool, nausea and vomiting  Endocrine: Negative  Genitourinary: Positive for pelvic pain and urgency  Musculoskeletal: Negative  Skin: Negative  Allergic/Immunologic: Negative  Neurological: Negative  Hematological: Negative  Psychiatric/Behavioral: Negative          Historical Information   Past Medical History:   Diagnosis Date   • COVID 2022     Past Surgical History:   Procedure Laterality Date   • APPENDECTOMY     •  SECTION      x 3   • CHOLECYSTECTOMY     • HYSTERECTOMY     • KNEE ARTHROSCOPY Bilateral      Social History   Social History     Substance and Sexual Activity   Alcohol Use Yes    Comment: occasionally     Social History     Substance and Sexual Activity   Drug Use Never     Social History     Tobacco Use   Smoking Status Every Day   • Packs/day: 0 50   • Types: Cigarettes   Smokeless Tobacco Never   Tobacco Comments    30 pk yr hx     E-Cigarette/Vaping   • E-Cigarette Use Never User      E-Cigarette/Vaping Substances     Family History:   Family History   Problem Relation Age of Onset   • Diabetes Mother    • Cancer Father        Meds/Allergies   all medications and allergies reviewed  No Known Allergies    Objective   First Vitals:   Blood Pressure: 125/72 (03/18/23 0409)  Pulse: 80 (03/18/23 0409)  Temperature: 98 1 °F (36 7 °C) (03/18/23 0409)  Respirations: 18 (03/18/23 0409)  SpO2: 98 % (03/18/23 0409)    Current Vitals:   Blood Pressure: 125/72 (03/18/23 0409)  Pulse: 80 (03/18/23 0409)  Temperature: 98 1 °F (36 7 °C) (03/18/23 0409)  Respirations: 18 (03/18/23 0409)  SpO2: 98 % (03/18/23 0409)    No intake or output data in the 24 hours ending 03/18/23 0511    Invasive Devices     Peripheral Intravenous Line  Duration           Peripheral IV 03/17/23 Proximal;Right;Ventral (anterior) Forearm <1 day                Physical Exam  General: NAD, obese  HENT: NCAT MMM  Neck: supple, no JVD  CV: nl rate  Lungs: nl wob  No resp distress  ABD: Soft, +TTP in the LLQ and suprapubic region, no rebound/guarding, nondistended  Extrem: No CCE  Neuro: AAOx3    Lab Results:   I have personally reviewed pertinent lab results    , CBC:   Lab Results   Component Value Date    WBC 13 71 (H) 03/17/2023    HGB 13 7 03/17/2023    HCT 41 8 03/17/2023    MCV 93 03/17/2023     (L) 03/17/2023    MCH 30 4 03/17/2023    MCHC 32 8 03/17/2023    RDW 13 2 03/17/2023   , CMP:   Lab Results   Component Value Date    SODIUM 137 03/17/2023    K 3 6 03/17/2023     03/17/2023    CO2 26 03/17/2023    BUN 7 03/17/2023    CREATININE 0 76 03/17/2023    CALCIUM 8 6 03/17/2023    AST 11 (L) 03/17/2023    ALT 9 03/17/2023    ALKPHOS 75 03/17/2023    EGFR 91 03/17/2023   , Coagulation: No results found for: PT, INR, APTT  Imaging: I have personally reviewed pertinent films in PACS  EKG, Pathology, and Other Studies: I have personally reviewed pertinent films in PACS    Code Status: Level 1 - Full Code  Advance Directive and Living Will:      Power of :    POLST:

## 2023-03-18 NOTE — LETTER
[address]  Dept: [phone]    March 19, 2023     Patient: Jcarlos White   YOB: 1975   Date of Visit: 3/18/2023       To Whom it May Concern:    Jcarlos White is under my professional care  She was seen in the hospital from 3/17/2023 to 03/19/23  She can return to work after her follow up appointment in 1-2 weeks  If you have any questions or concerns, please don't hesitate to call           Sincerely,          Jason Reyez, DO

## 2023-03-18 NOTE — LETTER
[address]  Dept: [phone]    March 19, 2023     Patient: Anastasia Oleary   YOB: 1975   Date of Visit: 3/18/2023       To Whom it May Concern:    Anastasia Oleary is under my professional care  She was seen in the hospital from 3/18/2023 to 03/19/23  She may return to work after follow up appointment in 1-2 weeks  If you have any questions or concerns, please don't hesitate to call           Sincerely,          Bryce Grimm, DO

## 2023-03-18 NOTE — PLAN OF CARE
Problem: Nutrition/Hydration-ADULT  Goal: Nutrient/Hydration intake appropriate for improving, restoring or maintaining nutritional needs  Description: Monitor and assess patient's nutrition/hydration status for malnutrition  Collaborate with interdisciplinary team and initiate plan and interventions as ordered  Monitor patient's weight and dietary intake as ordered or per policy  Utilize nutrition screening tool and intervene as necessary  Determine patient's food preferences and provide high-protein, high-caloric foods as appropriate       INTERVENTIONS:  - Monitor oral intake, urinary output, labs, and treatment plans  - Assess nutrition and hydration status and recommend course of action  - Evaluate amount of meals eaten  - Assist patient with eating if necessary   - Allow adequate time for meals  - Recommend/ encourage appropriate diets, oral nutritional supplements, and vitamin/mineral supplements  - Order, calculate, and assess calorie counts as needed  - Recommend, monitor, and adjust tube feedings and TPN/PPN based on assessed needs  - Assess need for intravenous fluids  - Provide specific nutrition/hydration education as appropriate  - Include patient/family/caregiver in decisions related to nutrition  3/18/2023 1158 by Li Flores RN  Outcome: Progressing  3/18/2023 1157 by Li Flores RN  Outcome: Progressing     Problem: PAIN - ADULT  Goal: Verbalizes/displays adequate comfort level or baseline comfort level  Description: Interventions:  - Encourage patient to monitor pain and request assistance  - Assess pain using appropriate pain scale  - Administer analgesics based on type and severity of pain and evaluate response  - Implement non-pharmacological measures as appropriate and evaluate response  - Consider cultural and social influences on pain and pain management  - Notify physician/advanced practitioner if interventions unsuccessful or patient reports new pain  Outcome: Progressing Problem: INFECTION - ADULT  Goal: Absence or prevention of progression during hospitalization  Description: INTERVENTIONS:  - Assess and monitor for signs and symptoms of infection  - Monitor lab/diagnostic results  - Monitor all insertion sites, i e  indwelling lines, tubes, and drains  - Monitor endotracheal if appropriate and nasal secretions for changes in amount and color  - [place] appropriate cooling/warming therapies per order  - Administer medications as ordered  - Instruct and encourage patient and family to use good hand hygiene technique  - Identify and instruct in appropriate isolation precautions for identified infection/condition  Outcome: Progressing  Goal: Absence of fever/infection during neutropenic period  Description: INTERVENTIONS:  - Monitor WBC    Outcome: Progressing     Problem: SAFETY ADULT  Goal: Patient will remain free of falls  Description: INTERVENTIONS:  - Educate patient/family on patient safety including physical limitations  - Instruct patient to call for assistance with activity   - Consult OT/PT to assist with strengthening/mobility   - Keep Call bell within reach  - Keep bed low and locked with side rails adjusted as appropriate  - Keep care items and personal belongings within reach  - Initiate and maintain comfort rounds  - Make Fall Risk Sign visible to staff  - Apply yellow socks and bracelet for high fall risk patients  - Consider moving patient to room near nurses station  Outcome: Progressing  Goal: Maintain or return to baseline ADL function  Description: INTERVENTIONS:  -  Assess patient's ability to carry out ADLs; assess patient's baseline for ADL function and identify physical deficits which impact ability to perform ADLs (bathing, care of mouth/teeth, toileting, grooming, dressing, etc )  - Assess/evaluate cause of self-care deficits   - Assess range of motion  - Assess patient's mobility; develop plan if impaired  - Assess patient's need for assistive devices and provide as appropriate  - Encourage maximum independence but intervene and supervise when necessary  - Involve family in performance of ADLs  - Assess for home care needs following discharge   - Consider OT consult to assist with ADL evaluation and planning for discharge  - Provide patient education as appropriate  Outcome: Progressing  Goal: Maintains/Returns to pre admission functional level  Description: INTERVENTIONS:  - Perform BMAT or MOVE assessment daily    - Set and communicate daily mobility goal to care team and patient/family/caregiver  - Collaborate with rehabilitation services on mobility goals if consulted  - Perform Range of Motion 3 times a day  - Reposition patient every 2 hours  - Dangle patient 3 times a day  - Stand patient 3 times a day  - Ambulate patient 3 times a day  - Out of bed to chair 3 times a day   - Out of bed for meals 3 times a day  - Out of bed for toileting  - Record patient progress and toleration of activity level   Outcome: Progressing     Problem: DISCHARGE PLANNING  Goal: Discharge to home or other facility with appropriate resources  Description: INTERVENTIONS:  - Identify barriers to discharge w/patient and caregiver  - Arrange for needed discharge resources and transportation as appropriate  - Identify discharge learning needs (meds, wound care, etc )  - Arrange for interpretive services to assist at discharge as needed  - Refer to Case Management Department for coordinating discharge planning if the patient needs post-hospital services based on physician/advanced practitioner order or complex needs related to functional status, cognitive ability, or social support system  Outcome: Progressing     Problem: Knowledge Deficit  Goal: Patient/family/caregiver demonstrates understanding of disease process, treatment plan, medications, and discharge instructions  Description: Complete learning assessment and assess knowledge base    Interventions:  - Provide teaching at level of understanding  - Provide teaching via preferred learning methods  Outcome: Progressing

## 2023-03-19 VITALS
HEART RATE: 68 BPM | RESPIRATION RATE: 18 BRPM | SYSTOLIC BLOOD PRESSURE: 104 MMHG | TEMPERATURE: 98.1 F | BODY MASS INDEX: 44.93 KG/M2 | DIASTOLIC BLOOD PRESSURE: 66 MMHG | HEIGHT: 65 IN | OXYGEN SATURATION: 96 %

## 2023-03-19 PROBLEM — K57.92 DIVERTICULITIS: Status: ACTIVE | Noted: 2023-03-19

## 2023-03-19 LAB
ANION GAP SERPL CALCULATED.3IONS-SCNC: 5 MMOL/L (ref 4–13)
BASOPHILS # BLD AUTO: 0.03 THOUSANDS/ÂΜL (ref 0–0.1)
BASOPHILS NFR BLD AUTO: 0 % (ref 0–1)
BUN SERPL-MCNC: 6 MG/DL (ref 5–25)
CALCIUM SERPL-MCNC: 8.4 MG/DL (ref 8.4–10.2)
CHLORIDE SERPL-SCNC: 106 MMOL/L (ref 96–108)
CO2 SERPL-SCNC: 28 MMOL/L (ref 21–32)
CREAT SERPL-MCNC: 0.79 MG/DL (ref 0.6–1.3)
EOSINOPHIL # BLD AUTO: 0.21 THOUSAND/ÂΜL (ref 0–0.61)
EOSINOPHIL NFR BLD AUTO: 3 % (ref 0–6)
ERYTHROCYTE [DISTWIDTH] IN BLOOD BY AUTOMATED COUNT: 12.2 % (ref 11.6–15.1)
GFR SERPL CREATININE-BSD FRML MDRD: 87 ML/MIN/1.73SQ M
GLUCOSE SERPL-MCNC: 103 MG/DL (ref 65–140)
HCT VFR BLD AUTO: 37.8 % (ref 34.8–46.1)
HGB BLD-MCNC: 12.1 G/DL (ref 11.5–15.4)
IMM GRANULOCYTES # BLD AUTO: 0.03 THOUSAND/UL (ref 0–0.2)
IMM GRANULOCYTES NFR BLD AUTO: 0 % (ref 0–2)
LYMPHOCYTES # BLD AUTO: 1.81 THOUSANDS/ÂΜL (ref 0.6–4.47)
LYMPHOCYTES NFR BLD AUTO: 22 % (ref 14–44)
MCH RBC QN AUTO: 30.5 PG (ref 26.8–34.3)
MCHC RBC AUTO-ENTMCNC: 32 G/DL (ref 31.4–37.4)
MCV RBC AUTO: 95 FL (ref 82–98)
MONOCYTES # BLD AUTO: 0.74 THOUSAND/ÂΜL (ref 0.17–1.22)
MONOCYTES NFR BLD AUTO: 9 % (ref 4–12)
NEUTROPHILS # BLD AUTO: 5.35 THOUSANDS/ÂΜL (ref 1.85–7.62)
NEUTS SEG NFR BLD AUTO: 66 % (ref 43–75)
NRBC BLD AUTO-RTO: 0 /100 WBCS
PLATELET # BLD AUTO: 246 THOUSANDS/UL (ref 149–390)
PMV BLD AUTO: 10.6 FL (ref 8.9–12.7)
POTASSIUM SERPL-SCNC: 3.7 MMOL/L (ref 3.5–5.3)
RBC # BLD AUTO: 3.97 MILLION/UL (ref 3.81–5.12)
SODIUM SERPL-SCNC: 139 MMOL/L (ref 135–147)
WBC # BLD AUTO: 8.17 THOUSAND/UL (ref 4.31–10.16)

## 2023-03-19 RX ORDER — ONDANSETRON 4 MG/1
4 TABLET, ORALLY DISINTEGRATING ORAL EVERY 6 HOURS PRN
Qty: 20 TABLET | Refills: 0 | Status: SHIPPED | OUTPATIENT
Start: 2023-03-19

## 2023-03-19 RX ORDER — AMOXICILLIN AND CLAVULANATE POTASSIUM 875; 125 MG/1; MG/1
1 TABLET, FILM COATED ORAL EVERY 12 HOURS SCHEDULED
Qty: 14 TABLET | Refills: 0 | Status: SHIPPED | OUTPATIENT
Start: 2023-03-19 | End: 2023-03-26

## 2023-03-19 RX ADMIN — METRONIDAZOLE 500 MG: 500 INJECTION, SOLUTION INTRAVENOUS at 00:34

## 2023-03-19 RX ADMIN — METRONIDAZOLE 500 MG: 500 INJECTION, SOLUTION INTRAVENOUS at 09:19

## 2023-03-19 NOTE — PLAN OF CARE
Problem: Nutrition/Hydration-ADULT  Goal: Nutrient/Hydration intake appropriate for improving, restoring or maintaining nutritional needs  Description: Monitor and assess patient's nutrition/hydration status for malnutrition  Collaborate with interdisciplinary team and initiate plan and interventions as ordered  Monitor patient's weight and dietary intake as ordered or per policy  Utilize nutrition screening tool and intervene as necessary  Determine patient's food preferences and provide high-protein, high-caloric foods as appropriate       INTERVENTIONS:  - Monitor oral intake, urinary output, labs, and treatment plans  - Assess nutrition and hydration status and recommend course of action  - Evaluate amount of meals eaten  - Assist patient with eating if necessary   - Allow adequate time for meals  - Recommend/ encourage appropriate diets, oral nutritional supplements, and vitamin/mineral supplements  - Order, calculate, and assess calorie counts as needed  - Recommend, monitor, and adjust tube feedings and TPN/PPN based on assessed needs  - Assess need for intravenous fluids  - Provide specific nutrition/hydration education as appropriate  - Include patient/family/caregiver in decisions related to nutrition  Outcome: Progressing     Problem: PAIN - ADULT  Goal: Verbalizes/displays adequate comfort level or baseline comfort level  Description: Interventions:  - Encourage patient to monitor pain and request assistance  - Assess pain using appropriate pain scale  - Administer analgesics based on type and severity of pain and evaluate response  - Implement non-pharmacological measures as appropriate and evaluate response  - Consider cultural and social influences on pain and pain management  - Notify physician/advanced practitioner if interventions unsuccessful or patient reports new pain  Outcome: Progressing     Problem: INFECTION - ADULT  Goal: Absence or prevention of progression during hospitalization  Description: INTERVENTIONS:  - Assess and monitor for signs and symptoms of infection  - Monitor lab/diagnostic results  - Monitor all insertion sites, i e  indwelling lines, tubes, and drains  - Monitor endotracheal if appropriate and nasal secretions for changes in amount and color  - [place] appropriate cooling/warming therapies per order  - Administer medications as ordered  - Instruct and encourage patient and family to use good hand hygiene technique  - Identify and instruct in appropriate isolation precautions for identified infection/condition  Outcome: Progressing     Problem: SAFETY ADULT  Goal: Patient will remain free of falls  Description: INTERVENTIONS:  - Educate patient/family on patient safety including physical limitations  - Instruct patient to call for assistance with activity   - Consult OT/PT to assist with strengthening/mobility   - Keep Call bell within reach  - Keep bed low and locked with side rails adjusted as appropriate  - Keep care items and personal belongings within reach  - Initiate and maintain comfort rounds  - Make Fall Risk Sign visible to staff  - Apply yellow socks and bracelet for high fall risk patients  - Consider moving patient to room near nurses station  Outcome: Progressing     Problem: DISCHARGE PLANNING  Goal: Discharge to home or other facility with appropriate resources  Description: INTERVENTIONS:  - Identify barriers to discharge w/patient and caregiver  - Arrange for needed discharge resources and transportation as appropriate  - Identify discharge learning needs (meds, wound care, etc )  - Arrange for interpretive services to assist at discharge as needed  - Refer to Case Management Department for coordinating discharge planning if the patient needs post-hospital services based on physician/advanced practitioner order or complex needs related to functional status, cognitive ability, or social support system  Outcome: Progressing     Problem: Knowledge Deficit  Goal: Patient/family/caregiver demonstrates understanding of disease process, treatment plan, medications, and discharge instructions  Description: Complete learning assessment and assess knowledge base    Interventions:  - Provide teaching at level of understanding  - Provide teaching via preferred learning methods  Outcome: Progressing     Problem: GASTROINTESTINAL - ADULT  Goal: Minimal or absence of nausea and/or vomiting  Description: INTERVENTIONS:  - Administer IV fluids if ordered to ensure adequate hydration  - Maintain NPO status until nausea and vomiting are resolved  - Nasogastric tube if ordered  - Administer ordered antiemetic medications as needed  - Provide nonpharmacologic comfort measures as appropriate  - Advance diet as tolerated, if ordered  - Consider nutrition services referral to assist patient with adequate nutrition and appropriate food choices  Outcome: Progressing  Goal: Maintains or returns to baseline bowel function  Description: INTERVENTIONS:  - Assess bowel function  - Encourage oral fluids to ensure adequate hydration  - Administer IV fluids if ordered to ensure adequate hydration  - Administer ordered medications as needed  - Encourage mobilization and activity  - Consider nutritional services referral to assist patient with adequate nutrition and appropriate food choices  Outcome: Progressing  Goal: Maintains adequate nutritional intake  Description: INTERVENTIONS:  - Monitor percentage of each meal consumed  - Identify factors contributing to decreased intake, treat as appropriate  - Assist with meals as needed  - Monitor I&O, weight, and lab values if indicated  - Obtain nutrition services referral as needed  Outcome: Progressing     Problem: METABOLIC, FLUID AND ELECTROLYTES - ADULT  Goal: Electrolytes maintained within normal limits  Description: INTERVENTIONS:  - Monitor labs and assess patient for signs and symptoms of electrolyte imbalances  - Administer electrolyte replacement as ordered  - Monitor response to electrolyte replacements, including repeat lab results as appropriate  - Instruct patient on fluid and nutrition as appropriate  Outcome: Progressing  Goal: Fluid balance maintained  Description: INTERVENTIONS:  - Monitor labs   - Monitor I/O and WT  - Instruct patient on fluid and nutrition as appropriate  - Assess for signs & symptoms of volume excess or deficit  Outcome: Progressing     Problem: HEMATOLOGIC - ADULT  Goal: Maintains hematologic stability  Description: INTERVENTIONS  - Assess for signs and symptoms of bleeding or hemorrhage  - Monitor labs  - Administer supportive blood products/factors as ordered and appropriate  Outcome: Progressing

## 2023-03-19 NOTE — PLAN OF CARE
Problem: Nutrition/Hydration-ADULT  Goal: Nutrient/Hydration intake appropriate for improving, restoring or maintaining nutritional needs  Description: Monitor and assess patient's nutrition/hydration status for malnutrition  Collaborate with interdisciplinary team and initiate plan and interventions as ordered  Monitor patient's weight and dietary intake as ordered or per policy  Utilize nutrition screening tool and intervene as necessary  Determine patient's food preferences and provide high-protein, high-caloric foods as appropriate       INTERVENTIONS:  - Monitor oral intake, urinary output, labs, and treatment plans  - Assess nutrition and hydration status and recommend course of action  - Evaluate amount of meals eaten  - Assist patient with eating if necessary   - Allow adequate time for meals  - Recommend/ encourage appropriate diets, oral nutritional supplements, and vitamin/mineral supplements  - Order, calculate, and assess calorie counts as needed  - Recommend, monitor, and adjust tube feedings and TPN/PPN based on assessed needs  - Assess need for intravenous fluids  - Provide specific nutrition/hydration education as appropriate  - Include patient/family/caregiver in decisions related to nutrition  Outcome: Progressing     Problem: PAIN - ADULT  Goal: Verbalizes/displays adequate comfort level or baseline comfort level  Description: Interventions:  - Encourage patient to monitor pain and request assistance  - Assess pain using appropriate pain scale  - Administer analgesics based on type and severity of pain and evaluate response  - Implement non-pharmacological measures as appropriate and evaluate response  - Consider cultural and social influences on pain and pain management  - Notify physician/advanced practitioner if interventions unsuccessful or patient reports new pain  Outcome: Progressing     Problem: INFECTION - ADULT  Goal: Absence or prevention of progression during hospitalization  Description: INTERVENTIONS:  - Assess and monitor for signs and symptoms of infection  - Monitor lab/diagnostic results  - Monitor all insertion sites, i e  indwelling lines, tubes, and drains  - Monitor endotracheal if appropriate and nasal secretions for changes in amount and color  - [place] appropriate cooling/warming therapies per order  - Administer medications as ordered  - Instruct and encourage patient and family to use good hand hygiene technique  - Identify and instruct in appropriate isolation precautions for identified infection/condition  Outcome: Progressing     Problem: SAFETY ADULT  Goal: Patient will remain free of falls  Description: INTERVENTIONS:  - Educate patient/family on patient safety including physical limitations  - Instruct patient to call for assistance with activity   - Consult OT/PT to assist with strengthening/mobility   - Keep Call bell within reach  - Keep bed low and locked with side rails adjusted as appropriate  - Keep care items and personal belongings within reach  - Initiate and maintain comfort rounds  - Make Fall Risk Sign visible to staff  - Apply yellow socks and bracelet for high fall risk patients  - Consider moving patient to room near nurses station  Outcome: Progressing     Problem: DISCHARGE PLANNING  Goal: Discharge to home or other facility with appropriate resources  Description: INTERVENTIONS:  - Identify barriers to discharge w/patient and caregiver  - Arrange for needed discharge resources and transportation as appropriate  - Identify discharge learning needs (meds, wound care, etc )  - Arrange for interpretive services to assist at discharge as needed  - Refer to Case Management Department for coordinating discharge planning if the patient needs post-hospital services based on physician/advanced practitioner order or complex needs related to functional status, cognitive ability, or social support system  Outcome: Progressing     Problem: Knowledge Deficit  Goal: Patient/family/caregiver demonstrates understanding of disease process, treatment plan, medications, and discharge instructions  Description: Complete learning assessment and assess knowledge base    Interventions:  - Provide teaching at level of understanding  - Provide teaching via preferred learning methods  Outcome: Progressing     Problem: GASTROINTESTINAL - ADULT  Goal: Minimal or absence of nausea and/or vomiting  Description: INTERVENTIONS:  - Administer IV fluids if ordered to ensure adequate hydration  - Maintain NPO status until nausea and vomiting are resolved  - Nasogastric tube if ordered  - Administer ordered antiemetic medications as needed  - Provide nonpharmacologic comfort measures as appropriate  - Advance diet as tolerated, if ordered  - Consider nutrition services referral to assist patient with adequate nutrition and appropriate food choices  Outcome: Progressing  Goal: Maintains or returns to baseline bowel function  Description: INTERVENTIONS:  - Assess bowel function  - Encourage oral fluids to ensure adequate hydration  - Administer IV fluids if ordered to ensure adequate hydration  - Administer ordered medications as needed  - Encourage mobilization and activity  - Consider nutritional services referral to assist patient with adequate nutrition and appropriate food choices  Outcome: Progressing  Goal: Maintains adequate nutritional intake  Description: INTERVENTIONS:  - Monitor percentage of each meal consumed  - Identify factors contributing to decreased intake, treat as appropriate  - Assist with meals as needed  - Monitor I&O, weight, and lab values if indicated  - Obtain nutrition services referral as needed  Outcome: Progressing     Problem: METABOLIC, FLUID AND ELECTROLYTES - ADULT  Goal: Electrolytes maintained within normal limits  Description: INTERVENTIONS:  - Monitor labs and assess patient for signs and symptoms of electrolyte imbalances  - Administer electrolyte replacement as ordered  - Monitor response to electrolyte replacements, including repeat lab results as appropriate  - Instruct patient on fluid and nutrition as appropriate  Outcome: Progressing  Goal: Fluid balance maintained  Description: INTERVENTIONS:  - Monitor labs   - Monitor I/O and WT  - Instruct patient on fluid and nutrition as appropriate  - Assess for signs & symptoms of volume excess or deficit  Outcome: Progressing     Problem: HEMATOLOGIC - ADULT  Goal: Maintains hematologic stability  Description: INTERVENTIONS  - Assess for signs and symptoms of bleeding or hemorrhage  - Monitor labs  - Administer supportive blood products/factors as ordered and appropriate  Outcome: Progressing

## 2023-03-19 NOTE — UTILIZATION REVIEW
Initial Clinical Review    Pt initially presented to [address] [place] [address]  Pt was transferred by EMS to [facility] for a higher level of care  Admission: Date/Time/Statement:   Admission Orders (From admission, onward)     Ordered        03/18/23 0450  Inpatient Admission  Once                      Orders Placed This Encounter   Procedures   • Inpatient Admission     Standing Status:   Standing     Number of Occurrences:   1     Order Specific Question:   Level of Care     Answer:   Med Surg [16]     Order Specific Question:   Estimated length of stay     Answer:   More than 2 Midnights     Order Specific Question:   Certification     Answer:   I certify that inpatient services are medically necessary for this patient for a duration of greater than two midnights  See H&P and MD Progress Notes for additional information about the patient's course of treatment  Initial Presentation:  46 y o  female who presented from [address] [place] [address] by EMS to [facility] as a direct admission  Inpatient admission for evaluation and treatment of diverticulitis  PMHx: [id number] (11/2022)  Presented w/ LLQ pain fro 1 week  Imaging shows diverticulitis w/ questionable microperf  On exam, tenderness in LLQ and suprapubic region  Plan: clear liquid diet, IV ABX, analgesics, antiemetics, monitor abdominal exam, Trend labs, replete electrolytes as needed  Date: 03/19/23   Day 2: Reports pain well controlled  Passing flatus  Mild prepubic tenderness on exam  Plan: Advance diet to low resdiual, discontinue IVF, continue IV ABX w/ transition to PO upon d/c, analgesics, antiemetics, Trend labs, replete electrolytes as needed         Wt Readings from Last 1 Encounters:   03/17/23 122 kg (270 lb)     Vital Signs:   Date/Time Temp Pulse Resp BP MAP (mmHg) SpO2 O2 Device   03/19/23 07:47:25 98 1 °F (36 7 °C) 68 18 104/66 79 96 % None (Room air)   03/18/23 23:40:50 98 8 °F (37 1 °C) 70 16 102/70 81 97 % None (Room air)   03/18/23 15:44:33 98 2 °F (36 8 °C) 64 14 85/60 Abnormal  68 96 % None (Room air)   03/18/23 07:51:46 98 1 °F (36 7 °C) 71 19 105/70 82 95 % None (Room air)   03/18/23 04:09:40 98 1 °F (36 7 °C) 80 18 125/72 90 98 % --     Pertinent Labs/Diagnostic Test Results:   Results from last 7 days   Lab Units 03/19/23  0533 03/18/23  0655 03/17/23 2051   WBC Thousand/uL 8 17 10 66* 13 71*   HEMOGLOBIN g/dL 12 1 12 5 13 7   HEMATOCRIT % 37 8 38 1 41 8   PLATELETS Thousands/uL 246 238 124*   NEUTROS ABS Thousands/µL 5 35 6 77  --          Results from last 7 days   Lab Units 03/19/23  0533 03/18/23  0655 03/17/23  2204   SODIUM mmol/L 139 138 137   POTASSIUM mmol/L 3 7 3 7 3 6   CHLORIDE mmol/L 106 106 104   CO2 mmol/L 28 26 26   ANION GAP mmol/L 5 6 7   BUN mg/dL 6 6 7   CREATININE mg/dL 0 79 0 77 0 76   EGFR ml/min/1 73sq m 87 90 91   CALCIUM mg/dL 8 4 8 7 8 6   MAGNESIUM mg/dL  --   --  2 1     Results from last 7 days   Lab Units 03/17/23  2204   AST U/L 11*   ALT U/L 9   ALK PHOS U/L 75   TOTAL PROTEIN g/dL 6 8   ALBUMIN g/dL 3 7   TOTAL BILIRUBIN mg/dL 0 35         Results from last 7 days   Lab Units 03/19/23  0533 03/18/23  0655 03/17/23  2204   GLUCOSE RANDOM mg/dL 103 94 101       Results from last 7 days   Lab Units 03/17/23 2051   CLARITY UA  Clear   COLOR UA  Yellow   SPEC GRAV UA  1 010   PH UA  6 5   GLUCOSE UA mg/dl Negative   KETONES UA mg/dl Negative   BLOOD UA  Negative   PROTEIN UA mg/dl Negative   NITRITE UA  Negative   BILIRUBIN UA  Negative   UROBILINOGEN UA E U /dl 0 2   LEUKOCYTES UA  Negative         Past Medical History:   Diagnosis Date   • COVID 11/09/2022       Admitting Diagnosis: complicated diverticulitis  Age/Sex: 46 y o  female  Admission Orders:  GI Low Fiber/Residue Diet  I&O  SCDs      Scheduled Medications:  cefTRIAXone, 1,000 mg, Intravenous, Q24H  heparin (porcine), 7,500 Units, Subcutaneous, Q8H PAUL  metroNIDAZOLE, 500 mg, Intravenous, Q8H  nicotine, 1 patch, Transdermal, Daily    Continuous IV Infusions:  dextrose 5 % and sodium chloride 0 45 % w/ KCl 20mEq/L, 75 mL/hr, Intravenous, Continuous; Start: 03/18/23 2230 End: 03/19/23 0808  lactated ringers, 125 mL/hr, Intravenous, Continuous; Start: 03/18/23 0500 End: 03/18/23 1532    PRN Meds:  acetaminophen, 650 mg, Oral, Q6H PRN; 3/18 x1  HYDROmorphone, 0 2 mg, Intravenous, Q4H PRN  ondansetron, 4 mg, Intravenous, Q4H PRN; 3/18 x1  oxyCODONE, 5 mg, Oral, Q4H PRN  oxyCODONE, 2 5 mg, Oral, Q4H PRN          Network Utilization Review Department  ATTENTION: Please call with any questions or concerns to [phone] and carefully listen to the prompts so that you are directed to the right person  All voicemails are confidential   Juan F Alegria all requests for admission clinical reviews, approved or denied determinations and any other requests to dedicated fax number below belonging to the campus where the patient is receiving treatment   List of dedicated fax numbers for the Facilities:  [address] DENIALS (Administrative/Medical Necessity) [phone]   [address] (Maternity/NICU/Pediatrics) [phone]   916 Beba Beth [phone]   [facility] [phone]   [address] [place] [address] [facility] [phone]   1555 [facility] Renetta Abad [facility] 134 815 [facility] [phone]

## 2023-03-19 NOTE — NURSING NOTE
Pt iv removed, avs reviewed, pt ambulated independently off the unit with her friend  All belongings left with pt

## 2023-03-19 NOTE — PROGRESS NOTES
General Surgery  Progress Note   Janna Gomez 46 y o  female MRN: [id number]  Unit/Bed#: E5 -01 Encounter: [id number]    Assessment:  64-year-old female with acute diverticulitis    Afebrile, VSS  WBC: 8 17 from 10 6; Hb 1 from 12 5: Creatinine: 0 79 from 0 77    UOP: Not recorded    Plan:  - Advance to low residual diet  - DC IV fluids  -Continue IV antibiotics; transition to p o  at discharge  - Pain and nausea control as needed  - DVT prophylaxis  - Plan for discharge today    Subjective/Objective     Subjective: Patient seen and examined at bedside, in no acute distress  No acute events overnight  Patient's pain is well controlled  Pt denies nausea or vomiting  Passing flatus  Objective:     Vitals:Blood pressure 104/66, pulse 68, temperature 98 1 °F (36 7 °C), temperature source Oral, resp  rate 18, height 5' 5" (1 651 m), SpO2 96 %  ,Body mass index is 44 93 kg/m²  Temp (24hrs), Av 4 °F (36 9 °C), Min:98 1 °F (36 7 °C), Max:98 8 °F (37 1 °C)  Current: Temperature: 98 1 °F (36 7 °C)      Intake/Output Summary (Last 24 hours) at 3/19/2023 0819  Last data filed at 3/19/2023 0034  Gross per 24 hour   Intake 280 ml   Output --   Net 280 ml       Invasive Devices     Peripheral Intravenous Line  Duration           Peripheral IV 23 Dorsal (posterior); Right Hand <1 day                Physical Exam:  General: No acute distress, alert and oriented  CV: Well perfused, regular rate and rhythm  Lungs: Normal work of breathing, no increased respiratory effort  Abdomen: Soft, mild prepubic tenderness, nondistended  Extremities: No edema, clubbing or cyanosis  Skin: Warm, dry    Lab Results: Results: I have personally reviewed all pertinent laboratory/tests results  VTE Prophylaxis: Sequential compression device (Venodyne)  and Enoxaparin (Lovenox)    Titi Gonsalez MD  3/19/2023

## 2023-03-20 NOTE — UTILIZATION REVIEW
NOTIFICATION OF INPATIENT ADMISSION   AUTHORIZATION REQUEST   SERVICING FACILITY:   [address] [place] [address] E [facility]  Tax ID: [id number]  NPI: [id number] ATTENDING PROVIDER:  Attending Name and NPI#: Adelina Garcia Md [[id number]]  Address: [address] [place] [address] E [facility]  Phone: [phone]     ADMISSION INFORMATION:  Place of Service: [address] [place] [address] Code: 21  Inpatient Admission Date/Time: 3/18/23  3:59 AM  Discharge Date/Time: 3/19/2023  3:06 PM  Admitting Diagnosis Code/Description:  complicated diverticulitis     UTILIZATION REVIEW CONTACT:  Uriel Olson Utilization   Network Utilization Review Department  Phone: [phone]  Fax: [phone]  Email: [email or web address]  Contact for approvals/pending authorizations, clinical reviews, and discharge  PHYSICIAN ADVISORY SERVICES:  Medical Necessity Denial & [facility] Review  Phone: [phone]  Fax: [phone]  Email: [email or web address]

## 2023-03-20 NOTE — UTILIZATION REVIEW
NOTIFICATION OF ADMISSION DISCHARGE   This is a Notification of Discharge from [address]  Please be advised that this patient has been discharge from our facility  Below you will find the admission and discharge date and time including the patient’s disposition  UTILIZATION REVIEW CONTACT:  Uriel Olson  Utilization   Network Utilization Review Department  Phone: [phone] x carefully listen to the prompts  All voicemails are confidential   Email: [email or web address]     ADMISSION INFORMATION  PRESENTATION DATE: 3/18/2023  3:59 AM  OBERVATION ADMISSION DATE:   INPATIENT ADMISSION DATE: 3/18/23  3:59 AM   DISCHARGE DATE: 3/19/2023  3:06 PM   DISPOSITION:Home/Self Care    IMPORTANT INFORMATION:  Send all requests for admission clinical reviews, approved or denied determinations and any other requests to dedicated fax number below belonging to the campus where the patient is receiving treatment   List of dedicated fax numbers:  [address] DENIALS (Administrative/Medical Necessity) [phone]   [address] (Maternity/NICU/Pediatrics) [phone]   [facility] [phone]   [place] [address] [phone]   [address] [phone]   220 [facility] [phone]   90 [facility] [phone]   [address] [place], [place] [address]ten\[facility] [place] [facility]\"" 119 [phone]   [facility]  [phone]   4054 [facility] [phone]   412 [facility] 850 E [facility] [phone]

## 2023-04-20 ENCOUNTER — HOSPITAL ENCOUNTER (OUTPATIENT)
Dept: CT IMAGING | Facility: HOSPITAL | Age: 48
Discharge: HOME | End: 2023-04-20

## 2023-04-20 DIAGNOSIS — K57.92 DIVERTICULITIS OF INTESTINE, PART UNSPECIFIED, WITHOUT PERFORATION OR ABSCESS WITHOUT BLEEDING: ICD-10-CM

## 2023-04-20 DIAGNOSIS — R14.0 ABDOMINAL DISTENSION (GASEOUS): ICD-10-CM

## 2023-04-20 DIAGNOSIS — R10.32 LEFT LOWER QUADRANT PAIN: ICD-10-CM

## 2023-04-20 RX ADMIN — IOHEXOL 100 ML: 350 INJECTION, SOLUTION INTRAVENOUS at 19:50

## 2023-07-01 ENCOUNTER — HOSPITAL ENCOUNTER (EMERGENCY)
Facility: HOSPITAL | Age: 48
Discharge: HOME | End: 2023-07-01
Attending: EMERGENCY MEDICINE
Payer: COMMERCIAL

## 2023-07-01 VITALS
RESPIRATION RATE: 16 BRPM | DIASTOLIC BLOOD PRESSURE: 57 MMHG | WEIGHT: 262 LBS | OXYGEN SATURATION: 96 % | SYSTOLIC BLOOD PRESSURE: 106 MMHG | BODY MASS INDEX: 43.65 KG/M2 | HEIGHT: 65 IN | TEMPERATURE: 97.1 F | HEART RATE: 95 BPM

## 2023-07-01 DIAGNOSIS — R35.0 URINARY FREQUENCY: Primary | ICD-10-CM

## 2023-07-01 LAB
ANION GAP SERPL CALCULATED.3IONS-SCNC: 8 MMOL/L
BASOPHILS # BLD AUTO: 0.03 THOUSANDS/ÂΜL (ref 0–0.1)
BASOPHILS NFR BLD AUTO: 0 % (ref 0–1)
BILIRUB UR QL STRIP: NEGATIVE
BUN SERPL-MCNC: 11 MG/DL (ref 5–25)
CALCIUM SERPL-MCNC: 9.7 MG/DL (ref 8.4–10.2)
CHLORIDE SERPL-SCNC: 104 MMOL/L (ref 96–108)
CLARITY UR: CLEAR
CO2 SERPL-SCNC: 26 MMOL/L (ref 21–32)
COLOR UR: YELLOW
CREAT SERPL-MCNC: 0.82 MG/DL (ref 0.6–1.3)
EOSINOPHIL # BLD AUTO: 0.26 THOUSAND/ÂΜL (ref 0–0.61)
EOSINOPHIL NFR BLD AUTO: 3 % (ref 0–6)
ERYTHROCYTE [DISTWIDTH] IN BLOOD BY AUTOMATED COUNT: 13.7 % (ref 11.6–15.1)
GFR SERPL CREATININE-BSD FRML MDRD: 83 ML/MIN/1.73SQ M
GLUCOSE SERPL-MCNC: 97 MG/DL (ref 65–140)
GLUCOSE UR STRIP-MCNC: NEGATIVE MG/DL
HCT VFR BLD AUTO: 45.4 % (ref 34.8–46.1)
HGB BLD-MCNC: 14.9 G/DL (ref 11.5–15.4)
HGB UR QL STRIP.AUTO: NEGATIVE
IMM GRANULOCYTES # BLD AUTO: 0.04 THOUSAND/UL (ref 0–0.2)
IMM GRANULOCYTES NFR BLD AUTO: 0 % (ref 0–2)
KETONES UR STRIP-MCNC: NEGATIVE MG/DL
LEUKOCYTE ESTERASE UR QL STRIP: NEGATIVE
LYMPHOCYTES # BLD AUTO: 2.57 THOUSANDS/ÂΜL (ref 0.6–4.47)
LYMPHOCYTES NFR BLD AUTO: 25 % (ref 14–44)
MCH RBC QN AUTO: 30.6 PG (ref 26.8–34.3)
MCHC RBC AUTO-ENTMCNC: 32.8 G/DL (ref 31.4–37.4)
MCV RBC AUTO: 93 FL (ref 82–98)
MONOCYTES # BLD AUTO: 0.89 THOUSAND/ÂΜL (ref 0.17–1.22)
MONOCYTES NFR BLD AUTO: 9 % (ref 4–12)
NEUTROPHILS # BLD AUTO: 6.36 THOUSANDS/ÂΜL (ref 1.85–7.62)
NEUTS SEG NFR BLD AUTO: 63 % (ref 43–75)
NITRITE UR QL STRIP: NEGATIVE
NRBC BLD AUTO-RTO: 0 /100 WBCS
PH UR STRIP.AUTO: 5 [PH]
PLATELET # BLD AUTO: 230 THOUSANDS/UL (ref 149–390)
PMV BLD AUTO: 10.6 FL (ref 8.9–12.7)
POTASSIUM SERPL-SCNC: 3.7 MMOL/L (ref 3.5–5.3)
PROT UR STRIP-MCNC: NEGATIVE MG/DL
RBC # BLD AUTO: 4.87 MILLION/UL (ref 3.81–5.12)
SODIUM SERPL-SCNC: 138 MMOL/L (ref 135–147)
SP GR UR STRIP.AUTO: 1.01 (ref 1–1.03)
UROBILINOGEN UR QL STRIP.AUTO: 0.2 E.U./DL
WBC # BLD AUTO: 10.15 THOUSAND/UL (ref 4.31–10.16)

## 2023-07-01 PROCEDURE — 85025 COMPLETE CBC W/AUTO DIFF WBC: CPT | Performed by: EMERGENCY MEDICINE

## 2023-07-01 PROCEDURE — 81003 URINALYSIS AUTO W/O SCOPE: CPT | Performed by: EMERGENCY MEDICINE

## 2023-07-01 PROCEDURE — 36415 COLL VENOUS BLD VENIPUNCTURE: CPT | Performed by: EMERGENCY MEDICINE

## 2023-07-01 PROCEDURE — 99283 EMERGENCY DEPT VISIT LOW MDM: CPT

## 2023-07-01 PROCEDURE — 80048 BASIC METABOLIC PNL TOTAL CA: CPT | Performed by: EMERGENCY MEDICINE

## 2023-07-01 NOTE — ED PROVIDER NOTES
History  Chief Complaint   Patient presents with   • Urinary Frequency     Pt feels like she has urinary urgency and frequency as well as right flank pain. Denies any burning with urination but reports an odor. Patient is a 52-year-old female, presenting to the emergency department complaining of urinary frequency and urgency over the past week with odorous urine, she denies any blood in her urine, she has had some nausea but no vomiting, she had diarrhea earlier today as well but that has resolved, she denies any fever or chills, no sick contacts, she also reports some pain in her low back          Prior to Admission Medications   Prescriptions Last Dose Informant Patient Reported? Taking?   ondansetron (Zofran ODT) 4 mg disintegrating tablet   No No   Sig: Take 1 tablet (4 mg total) by mouth every 6 (six) hours as needed for nausea or vomiting      Facility-Administered Medications: None       Past Medical History:   Diagnosis Date   • COVID 2022       Past Surgical History:   Procedure Laterality Date   • APPENDECTOMY     •  SECTION      x 3   • CHOLECYSTECTOMY     • HYSTERECTOMY     • KNEE ARTHROSCOPY Bilateral        Family History   Problem Relation Age of Onset   • Diabetes Mother    • Cancer Father      I have reviewed and agree with the history as documented. E-Cigarette/Vaping   • E-Cigarette Use Never User      E-Cigarette/Vaping Substances     Social History     Tobacco Use   • Smoking status: Every Day     Packs/day: 0.50     Types: Cigarettes   • Smokeless tobacco: Never   • Tobacco comments:     30 pk yr hx   Vaping Use   • Vaping Use: Never used   Substance Use Topics   • Alcohol use: Yes     Comment: occasionally   • Drug use: Never       Review of Systems   Constitutional: Negative. HENT: Negative. Eyes: Negative. Respiratory: Negative. Cardiovascular: Negative. Gastrointestinal: Positive for nausea. Endocrine: Negative.     Genitourinary: Positive for dysuria, frequency and urgency. Musculoskeletal: Negative. Skin: Negative. Allergic/Immunologic: Negative. Neurological: Negative. Hematological: Negative. Psychiatric/Behavioral: Negative. Physical Exam  Physical Exam  Constitutional:       Appearance: She is well-developed. HENT:      Head: Normocephalic and atraumatic. Nose: Nose normal.      Mouth/Throat:      Mouth: Mucous membranes are moist.   Eyes:      Conjunctiva/sclera: Conjunctivae normal.      Pupils: Pupils are equal, round, and reactive to light. Cardiovascular:      Rate and Rhythm: Tachycardia present. Pulmonary:      Effort: Pulmonary effort is normal.   Abdominal:      Palpations: Abdomen is soft. Musculoskeletal:         General: Normal range of motion. Cervical back: Normal range of motion and neck supple. Skin:     General: Skin is warm and dry. Neurological:      Mental Status: She is alert and oriented to person, place, and time.          Vital Signs  ED Triage Vitals [07/01/23 0033]   Temperature Pulse Respirations Blood Pressure SpO2   (!) 97.1 °F (36.2 °C) (!) 108 19 131/85 99 %      Temp Source Heart Rate Source Patient Position - Orthostatic VS BP Location FiO2 (%)   Temporal Monitor Lying Right arm --      Pain Score       5           Vitals:    07/01/23 0033 07/01/23 0100 07/01/23 0115 07/01/23 0130   BP: 131/85 113/55  106/57   Pulse: (!) 108 102 94 95   Patient Position - Orthostatic VS: Lying   Other (Comment)         Visual Acuity      ED Medications  Medications - No data to display    Diagnostic Studies  Results Reviewed     Procedure Component Value Units Date/Time    Basic metabolic panel [[id number]] Collected: 07/01/23 0049    Lab Status: Final result Specimen: Blood from Hand, Right Updated: 07/01/23 0125     Sodium 138 mmol/L      Potassium 3.7 mmol/L      Chloride 104 mmol/L      CO2 26 mmol/L      ANION GAP 8 mmol/L      BUN 11 mg/dL      Creatinine 0.82 mg/dL      Glucose 97 mg/dL Calcium 9.7 mg/dL      eGFR 83 ml/min/1.73sq m     Narrative:      National Kidney Disease Foundation guidelines for Chronic Kidney Disease (CKD):   •  Stage 1 with normal or high GFR (GFR > 90 mL/min/1.73 square meters)  •  Stage 2 Mild CKD (GFR = 60-89 mL/min/1.73 square meters)  •  Stage 3A Moderate CKD (GFR = 45-59 mL/min/1.73 square meters)  •  Stage 3B Moderate CKD (GFR = 30-44 mL/min/1.73 square meters)  •  Stage 4 Severe CKD (GFR = 15-29 mL/min/1.73 square meters)  •  Stage 5 End Stage CKD (GFR <15 mL/min/1.73 square meters)  Note: GFR calculation is accurate only with a steady state creatinine    CBC and differential [[id number]] Collected: 07/01/23 0049    Lab Status: Final result Specimen: Blood from Hand, Right Updated: 07/01/23 0102     WBC 10.15 Thousand/uL      RBC 4.87 Million/uL      Hemoglobin 14.9 g/dL      Hematocrit 45.4 %      MCV 93 fL      MCH 30.6 pg      MCHC 32.8 g/dL      RDW 13.7 %      MPV 10.6 fL      Platelets 637 Thousands/uL      nRBC 0 /100 WBCs      Neutrophils Relative 63 %      Immat GRANS % 0 %      Lymphocytes Relative 25 %      Monocytes Relative 9 %      Eosinophils Relative 3 %      Basophils Relative 0 %      Neutrophils Absolute 6.36 Thousands/µL      Immature Grans Absolute 0.04 Thousand/uL      Lymphocytes Absolute 2.57 Thousands/µL      Monocytes Absolute 0.89 Thousand/µL      Eosinophils Absolute 0.26 Thousand/µL      Basophils Absolute 0.03 Thousands/µL     UA w Reflex to Microscopic w Reflex to Culture [[id number]] Collected: 07/01/23 0036    Lab Status: Final result Specimen: Urine, Clean Catch Updated: 07/01/23 0058     Color, UA Yellow     Clarity, UA Clear     Specific Gravity, UA 1.015     pH, UA 5.0     Leukocytes, UA Negative     Nitrite, UA Negative     Protein, UA Negative mg/dl      Glucose, UA Negative mg/dl      Ketones, UA Negative mg/dl      Urobilinogen, UA 0.2 E.U./dl      Bilirubin, UA Negative     Occult Blood, UA Negative                 No orders to display              Procedures  Procedures         ED Course  ED Course as of 07/01/23 0306   Sat Jul 01, 2023   0306 CBC and differential   0306 UA w Reflex to Microscopic w Reflex to Culture                               SBIRT 20yo+    Flowsheet Row Most Recent Value   Initial Alcohol Screen: US AUDIT-C     1. How often do you have a drink containing alcohol? 0 Filed at: 07/01/2023 0036   2. How many drinks containing alcohol do you have on a typical day you are drinking? 0 Filed at: 07/01/2023 0036   3b. FEMALE Any Age, or MALE 65+: How often do you have 4 or more drinks on one occassion? 0 Filed at: 07/01/2023 0036   Audit-C Score 0 Filed at: 07/01/2023 8359   SUBHASH: How many times in the past year have you. .. Used an illegal drug or used a prescription medication for non-medical reasons? Never Filed at: 07/01/2023 0036                    Medical Decision Making  Abdominal exam without peritoneal signs. No evidence of acute abdomen at this time. Well-appearing. Given work-up, low suspicion for acute hepatobiliary disease (including acute cholecystitis or cholangitis), acute pancreatitis (negative lipase), PUD (including gastric perforation), acute infectious processes (pneumonia, hepatitis, pyelonephritis), acute appendicitis, vascular catastrophe, bowel obstruction, viscus perforation, testicular torsion, or diverticulitis. Presentation not consistent with other acute emergent causes of abdominal pain at this time. Urinary frequency: acute illness or injury  Amount and/or Complexity of Data Reviewed  Labs: ordered. Decision-making details documented in ED Course.           Disposition  Final diagnoses:   Urinary frequency     Time reflects when diagnosis was documented in both MDM as applicable and the Disposition within this note     Time User Action Codes Description Comment    7/1/2023  1:35 AM Ky Modi Add [R35.0] Urinary frequency       ED Disposition     ED Disposition   Discharge Condition   Stable    Date/Time   Sat Jul 1, 2023  1:35 AM    Comment   [facility] discharge to home/self care. Follow-up Information     Follow up With Specialties Details Why Contact Info    Griselda Woodruff MD Family Medicine In 2 days  [address]  [phone]            Discharge Medication List as of 7/1/2023  1:35 AM      CONTINUE these medications which have NOT CHANGED    Details   ondansetron (Zofran ODT) 4 mg disintegrating tablet Take 1 tablet (4 mg total) by mouth every 6 (six) hours as needed for nausea or vomiting, Starting Sun 3/19/2023, Normal             No discharge procedures on file.     PDMP Review     None          ED Provider  Electronically Signed by           Evelio Griffin DO  07/01/23 1091

## 2024-02-12 ENCOUNTER — RA CDI HCC (OUTPATIENT)
Dept: OTHER | Facility: HOSPITAL | Age: 49
End: 2024-02-12

## 2024-02-12 NOTE — PROGRESS NOTES
NOT on the BPA- please assess using MEAT for 2024 billing    HCC coding opportunities          Chart Reviewed number of suggestions sent to Provider: 1     Patients Insurance        Commercial Insurance: [employer]

## 2024-02-19 ENCOUNTER — OFFICE VISIT (OUTPATIENT)
Dept: FAMILY MEDICINE CLINIC | Facility: CLINIC | Age: 49
End: 2024-02-19
Payer: COMMERCIAL

## 2024-02-19 VITALS
OXYGEN SATURATION: 99 % | DIASTOLIC BLOOD PRESSURE: 72 MMHG | HEART RATE: 88 BPM | BODY MASS INDEX: 45.55 KG/M2 | HEIGHT: 65 IN | SYSTOLIC BLOOD PRESSURE: 112 MMHG | WEIGHT: 273.37 LBS

## 2024-02-19 DIAGNOSIS — Z12.12 ENCOUNTER FOR COLORECTAL CANCER SCREENING: ICD-10-CM

## 2024-02-19 DIAGNOSIS — J98.4 PNEUMONITIS: Primary | ICD-10-CM

## 2024-02-19 DIAGNOSIS — Z12.31 ENCOUNTER FOR SCREENING MAMMOGRAM FOR BREAST CANCER: ICD-10-CM

## 2024-02-19 DIAGNOSIS — Z11.59 NEED FOR HEPATITIS C SCREENING TEST: ICD-10-CM

## 2024-02-19 DIAGNOSIS — K57.92 DIVERTICULITIS: ICD-10-CM

## 2024-02-19 DIAGNOSIS — F41.1 GAD (GENERALIZED ANXIETY DISORDER): ICD-10-CM

## 2024-02-19 DIAGNOSIS — Z12.11 ENCOUNTER FOR COLORECTAL CANCER SCREENING: ICD-10-CM

## 2024-02-19 DIAGNOSIS — Z83.3 FAMILY HISTORY OF DIABETES MELLITUS (DM): ICD-10-CM

## 2024-02-19 DIAGNOSIS — Z12.11 SCREENING FOR COLON CANCER: ICD-10-CM

## 2024-02-19 DIAGNOSIS — J06.9 VIRAL UPPER RESPIRATORY TRACT INFECTION: ICD-10-CM

## 2024-02-19 DIAGNOSIS — Z00.00 ENCOUNTER FOR MEDICAL EXAMINATION TO ESTABLISH CARE: ICD-10-CM

## 2024-02-19 DIAGNOSIS — E66.01 CLASS 3 SEVERE OBESITY DUE TO EXCESS CALORIES WITHOUT SERIOUS COMORBIDITY WITH BODY MASS INDEX (BMI) OF 45.0 TO 49.9 IN ADULT (HCC): ICD-10-CM

## 2024-02-19 DIAGNOSIS — H65.23 BILATERAL CHRONIC SEROUS OTITIS MEDIA: ICD-10-CM

## 2024-02-19 DIAGNOSIS — Z72.0 TOBACCO USE: ICD-10-CM

## 2024-02-19 DIAGNOSIS — F51.01 PRIMARY INSOMNIA: ICD-10-CM

## 2024-02-19 PROCEDURE — 99204 OFFICE O/P NEW MOD 45 MIN: CPT | Performed by: PHYSICIAN ASSISTANT

## 2024-02-19 RX ORDER — AMOXICILLIN AND CLAVULANATE POTASSIUM 875; 125 MG/1; MG/1
1 TABLET, FILM COATED ORAL EVERY 12 HOURS SCHEDULED
Qty: 14 TABLET | Refills: 0 | Status: SHIPPED | OUTPATIENT
Start: 2024-02-19 | End: 2024-02-26

## 2024-02-19 NOTE — PROGRESS NOTES
Assessment/Plan:       1. Pneumonitis  -     amoxicillin-clavulanate (AUGMENTIN) 875-125 mg per tablet; Take 1 tablet by mouth every 12 (twelve) hours for 7 days    2. Encounter for screening mammogram for breast cancer  -     Mammo screening bilateral w 3d & cad; Future    3. Need for hepatitis C screening test  -     Hepatitis C antibody; Future    4. Encounter for colorectal cancer screening    5. Screening for colon cancer  -     Ambulatory Referral to Gastroenterology; Future; Expected date: 05/19/2024    6. Encounter for medical examination to establish care    7. Viral upper respiratory tract infection    8. Tobacco use    9. Diverticulitis    10. Primary insomnia    11. Class 3 severe obesity due to excess calories without serious comorbidity with body mass index (BMI) of 45.0 to 49.9 in adult (HCC)  -     Lipid panel; Future    12. Bilateral chronic serous otitis media    13. CHARLES (generalized anxiety disorder)  -     sertraline (ZOLOFT) 50 mg tablet; Take 1 tablet (50 mg total) by mouth daily    14. Family history of diabetes mellitus (DM)  -     Hemoglobin A1C; Future     49-year-old female who is establishing care.  Her best friend who is a patient in my practice recommended this practice after being at [facility] for 20 years.  For the last 2 weeks, the patient has been coughing and having a lot of sinus pressure and pain.  She has bilateral fullness in her both ears.  She has a longstanding history of having ear problems with increased fluid in her ears and at 1 time, a perforation one of her ears.  She has a lot of scar tissue.  She did see to ENT surgeons including Dr. Che who said that reconstruction of her tympanic membrane would have to be done and that she would have to go to [place] to have this and that she would have hearing loss associated with this procedure.    Patient continues to have a lot of phlegm production.  She continues coughing on a regular basis.  No  hemoptysis.  Her throat is sore as a result of her ongoing coughing.  She also heard herself wheezing.  Coughing gets worse when laying supine.  Patient has had fever and chills along with the sinus pressure and pain.    She had some leftover amoxicillin which she took a total of 5 pills.  She felt that the amoxicillin did help some of the symptoms.  Patient was also taking Mucinex extreme cold and fever and she was upset that this did not break up her mucus to any great extent.    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  She has been able to continue to work.    Patient admits having a lot of anxiety.  Her CHARLES-7 score was 18.  Her PHQ screen was normal.  We are going to start treatment with sertraline 50 mg daily for her generalized anxiety.  She also has initial phase insomnia and the sertraline controlling her anxiety can be helpful for this.    Patient's BMI is 45.49.  She would like to discuss ways to lose weight which we will have a deep discussion when she comes in for her annual exam.    Patient had a flare of diverticulitis about 6 weeks ago.  She was hospitalized 1 time in the past for diverticulitis but did not need surgery was treated with antibiotics.  She is now in the window where she can have a colonoscopy as she has never had 1 in the past.    Patient is due for her mammogram and she has this at [facility].  Referral given for this.  She has never had hepatitis C screening and this will also be done.  Arrangements are being made for getting some baseline labs that she has not had done including a lipid profile and hemoglobin A1c as the patient's mother has a longstanding history of type 2 diabetes.    Patient is in the precontemplation stage for tobacco cessation.  She did not want to consider this at this time.    A total of 45 minutes was spent rendering care for this patient.  This time included review of the patient's electronic medical record,  "performing the history and physical, reviewing appropriate labs and/or images, developing a treatment and assessment plan, answering patient's questions and concerns, and documenting the patient visit.  Patient will have lipid profile hepatitis C and hemoglobin A1c prior to her visit for her annual exam in 4 weeks.    Subjective:      Patient ID: Kena Dahl is a 49 y.o. female.    HPI: 49-year-old female establishing care.  Patient is just overcome being treated for left upper extremity cellulitis treated with antibiotics resolving the condition.    Patient admits to having a lot of anxiety and states that she will be amenable to having treatment for this.  She is having 2 weeks of upper respiratory symptoms along with ear symptoms and sinus symptoms.  She may indeed have had influenza complicated by a viral or bacterial pneumonitis.  Will cover with antibiotics.    Patient denies changes in her bowel or bladder habits.  She admits a lot of chest pain from her coughing and is complaining of pain in the right rib area.  She states that her coughing has been so dramatic that this is causing the symptoms.  She is coughing up phlegm which is thick and yellow and very profuse.  No hemoptysis.    The following portions of the patient's history were reviewed and updated as appropriate: allergies, current medications, past family history, past medical history, past social history, past surgical history, and problem list.    Review of Systems has had a lifetime of problems with both ears including chronic infections and perforation spontaneously in the past.  Admits having a lot of scar tissue.  No pharyngitis at this time but throat is sore from coughing and postnasal drainage.    Objective:      /72 (BP Location: Left arm, Patient Position: Sitting, Cuff Size: Large)   Pulse 88   Ht 5' 5\" (1.651 m)   Wt 124 kg (273 lb 5.9 oz)   SpO2 99%   BMI 45.49 kg/m²          Physical Exam well-developed well-nourished " 49-year-old female who is alert oriented and cooperative with exam.  She looks ill but not toxic.  Blood pressure well-controlled.    Heart is regular rate without murmur rub or gallops.  Lungs revealed some end expiratory wheezing with some egophony at the right base consistent with a pneumonitis.  She has tenderness along the lateral right rib cage probably secondary to a pulled muscle from profuse coughing.

## 2024-02-28 ENCOUNTER — APPOINTMENT (OUTPATIENT)
Dept: LAB | Facility: HOSPITAL | Age: 49
End: 2024-02-28
Payer: COMMERCIAL

## 2024-02-28 DIAGNOSIS — Z83.3 FAMILY HISTORY OF DIABETES MELLITUS (DM): ICD-10-CM

## 2024-02-28 DIAGNOSIS — E66.01 CLASS 3 SEVERE OBESITY DUE TO EXCESS CALORIES WITHOUT SERIOUS COMORBIDITY WITH BODY MASS INDEX (BMI) OF 45.0 TO 49.9 IN ADULT (HCC): ICD-10-CM

## 2024-02-28 DIAGNOSIS — Z11.59 NEED FOR HEPATITIS C SCREENING TEST: ICD-10-CM

## 2024-02-28 LAB
CHOLEST SERPL-MCNC: 186 MG/DL
EST. AVERAGE GLUCOSE BLD GHB EST-MCNC: 114 MG/DL
HBA1C MFR BLD: 5.6 %
HCV AB SER QL: NORMAL
HDLC SERPL-MCNC: 46 MG/DL
LDLC SERPL CALC-MCNC: 123 MG/DL (ref 0–100)
NONHDLC SERPL-MCNC: 140 MG/DL
TRIGL SERPL-MCNC: 83 MG/DL

## 2024-02-28 PROCEDURE — 36415 COLL VENOUS BLD VENIPUNCTURE: CPT

## 2024-02-28 PROCEDURE — 83036 HEMOGLOBIN GLYCOSYLATED A1C: CPT

## 2024-02-28 PROCEDURE — 86803 HEPATITIS C AB TEST: CPT

## 2024-02-28 PROCEDURE — 80061 LIPID PANEL: CPT

## 2024-03-01 ENCOUNTER — OFFICE VISIT (OUTPATIENT)
Dept: URGENT CARE | Facility: CLINIC | Age: 49
End: 2024-03-01
Payer: COMMERCIAL

## 2024-03-01 ENCOUNTER — APPOINTMENT (OUTPATIENT)
Dept: RADIOLOGY | Facility: CLINIC | Age: 49
End: 2024-03-01
Payer: COMMERCIAL

## 2024-03-01 ENCOUNTER — TELEPHONE (OUTPATIENT)
Age: 49
End: 2024-03-01

## 2024-03-01 VITALS
DIASTOLIC BLOOD PRESSURE: 88 MMHG | SYSTOLIC BLOOD PRESSURE: 132 MMHG | RESPIRATION RATE: 18 BRPM | HEART RATE: 85 BPM | TEMPERATURE: 97.7 F | OXYGEN SATURATION: 98 % | HEIGHT: 65 IN | WEIGHT: 273 LBS | BODY MASS INDEX: 45.48 KG/M2

## 2024-03-01 DIAGNOSIS — S29.011A MUSCLE STRAIN OF CHEST WALL, INITIAL ENCOUNTER: ICD-10-CM

## 2024-03-01 DIAGNOSIS — J40 BRONCHITIS: Primary | ICD-10-CM

## 2024-03-01 PROCEDURE — 99213 OFFICE O/P EST LOW 20 MIN: CPT | Performed by: PHYSICIAN ASSISTANT

## 2024-03-01 PROCEDURE — 71101 X-RAY EXAM UNILAT RIBS/CHEST: CPT

## 2024-03-01 RX ORDER — LIDOCAINE 50 MG/G
1 PATCH TOPICAL DAILY
Qty: 10 PATCH | Refills: 0 | Status: SHIPPED | OUTPATIENT
Start: 2024-03-01

## 2024-03-01 RX ORDER — AZITHROMYCIN 250 MG/1
TABLET, FILM COATED ORAL
Qty: 6 TABLET | Refills: 0 | Status: SHIPPED | OUTPATIENT
Start: 2024-03-01 | End: 2024-03-05

## 2024-03-01 RX ORDER — IBUPROFEN 600 MG/1
600 TABLET ORAL EVERY 6 HOURS PRN
Qty: 30 TABLET | Refills: 0 | Status: SHIPPED | OUTPATIENT
Start: 2024-03-01

## 2024-03-01 RX ORDER — BENZONATATE 100 MG/1
100 CAPSULE ORAL 3 TIMES DAILY PRN
Qty: 20 CAPSULE | Refills: 0 | Status: SHIPPED | OUTPATIENT
Start: 2024-03-01

## 2024-03-01 NOTE — TELEPHONE ENCOUNTER
Patient called and wanted to see if the PCP could place an order for an xray.  She stated that her ribs are still hurting and its not gotten any better.  She also wanted the PCP to know that she still has a lot of mucus and ear are bothering her.    Please advise the patient if an xray will be placed.  [phone]

## 2024-03-01 NOTE — PROGRESS NOTES
"[facility]        NAME: Kena Dahl is a 49 y.o. female  : 1975    MRN: [id number]  DATE: 2024  TIME: 5:36 PM    /88   Pulse 85   Temp 97.7 °F (36.5 °C)   Resp 18   Ht 5' 5\" (1.651 m)   Wt 124 kg (273 lb)   SpO2 98%   BMI 45.43 kg/m²     Assessment and Plan   Bronchitis [J40]  1. Bronchitis  XR ribs right w pa chest min 3 views    azithromycin (ZITHROMAX) 250 mg tablet    benzonatate (TESSALON PERLES) 100 mg capsule    ibuprofen (MOTRIN) 600 mg tablet    lidocaine (Lidoderm) 5 %      2. Muscle strain of chest wall, initial encounter  azithromycin (ZITHROMAX) 250 mg tablet    benzonatate (TESSALON PERLES) 100 mg capsule    ibuprofen (MOTRIN) 600 mg tablet    lidocaine (Lidoderm) 5 %            Patient Instructions       Follow up with PCP in 3-5 days.  Proceed to  ER if symptoms worsen.    Chief Complaint     Chief Complaint   Patient presents with    Earache     Symptoms started a week and a half ago by her PCP was treated for an earache and pneumonia. She states she finished the prescribed Augmentin however she is still having L ear pain, and now is having R rib pain         History of Present Illness       Pt with right lower rib pain with coughing , pt still cough for 2 weeks , pt with augmentin finishing     Earache   Associated symptoms include coughing.       Review of Systems   Review of Systems   Constitutional: Negative.    HENT:  Positive for ear pain.    Eyes: Negative.    Respiratory:  Positive for cough.    Cardiovascular: Negative.    Gastrointestinal: Negative.    Endocrine: Negative.    Genitourinary: Negative.    Musculoskeletal: Negative.    Allergic/Immunologic: Negative.    Neurological: Negative.    Hematological: Negative.    Psychiatric/Behavioral: Negative.     All other systems reviewed and are negative.        Current Medications       Current Outpatient Medications:     azithromycin (ZITHROMAX) 250 mg tablet, Take 2 tablets today then 1 tablet daily " "x 4 days, Disp: 6 tablet, Rfl: 0    benzonatate (TESSALON PERLES) 100 mg capsule, Take 1 capsule (100 mg total) by mouth 3 (three) times a day as needed for cough, Disp: 20 capsule, Rfl: 0    ibuprofen (MOTRIN) 600 mg tablet, Take 1 tablet (600 mg total) by mouth every 6 (six) hours as needed for mild pain, Disp: 30 tablet, Rfl: 0    lidocaine (Lidoderm) 5 %, Apply 1 patch topically over 12 hours daily Remove & Discard patch within 12 hours or as directed by MD, Disp: 10 patch, Rfl: 0    sertraline (ZOLOFT) 50 mg tablet, Take 1 tablet (50 mg total) by mouth daily, Disp: 30 tablet, Rfl: 5    Current Allergies     Allergies as of 2024    (No Known Allergies)            The following portions of the patient's history were reviewed and updated as appropriate: allergies, current medications, past family history, past medical history, past social history, past surgical history and problem list.     Past Medical History:   Diagnosis Date    Anxiety     COVID 2022    Diverticulitis     Diverticulitis of colon        Past Surgical History:   Procedure Laterality Date    APPENDECTOMY       SECTION      x 3    CHOLECYSTECTOMY      HYSTERECTOMY      KNEE ARTHROSCOPY Bilateral        Family History   Problem Relation Age of Onset    Diabetes Mother     Colon cancer Father     Cancer Father          Medications have been verified.        Objective   /88   Pulse 85   Temp 97.7 °F (36.5 °C)   Resp 18   Ht 5' 5\" (1.651 m)   Wt 124 kg (273 lb)   SpO2 98%   BMI 45.43 kg/m²        Physical Exam     Physical Exam  Vitals and nursing note reviewed.   Constitutional:       Appearance: Normal appearance. She is normal weight.   HENT:      Head: Normocephalic and atraumatic.      Nose: Congestion and rhinorrhea present.      Mouth/Throat:      Mouth: Mucous membranes are moist.   Eyes:      Extraocular Movements: Extraocular movements intact.      Conjunctiva/sclera: Conjunctivae normal.      Pupils: Pupils " are equal, round, and reactive to light.   Cardiovascular:      Rate and Rhythm: Normal rate and regular rhythm.      Pulses: Normal pulses.      Heart sounds: Normal heart sounds.   Pulmonary:      Comments: Right lower rib pain mid axillary to mid clavicular line    Abdominal:      General: Bowel sounds are normal.      Palpations: Abdomen is soft.   Musculoskeletal:         General: Normal range of motion.      Cervical back: Normal range of motion and neck supple.   Skin:     General: Skin is warm.   Neurological:      Mental Status: She is alert and oriented to person, place, and time.

## 2024-03-01 NOTE — TELEPHONE ENCOUNTER
I called and spoke with Kena. We discussed her symptoms and given that an xray of her ribs would not , we will hold off on this at this time. I suspect the rib pain is related to intercostal muscle strain from coughing, and with her ongoing cough this is not healing well. She continues to report ear pain, congestion and cough. Unfortunately I cannot perform an exam over the phone and recommend she see a provider to assess this as we have not seen her in the office since 2/19. Given that our office is closed at this time, I recommend she go to urgent care.

## 2024-03-01 NOTE — TELEPHONE ENCOUNTER
Patient called back and wanted to see why she has not heard.  Spoke to the office and they will see if the other provider can assist in getting the order.  Patient will be waiting in her car.

## 2024-03-01 NOTE — LETTER
March 1, 2024     Patient: Kena Dahl   YOB: 1975   Date of Visit: 3/1/2024       To Whom it May Concern:    Kena Dahl was seen in my clinic on 3/1/2024. She may return to work on 03/04/2024 .    If you have any questions or concerns, please don't hesitate to call.         Sincerely,          Jan Spicer Jr PAKristineC        CC: No Recipients   16

## 2024-03-06 ENCOUNTER — OFFICE VISIT (OUTPATIENT)
Dept: FAMILY MEDICINE CLINIC | Facility: CLINIC | Age: 49
End: 2024-03-06
Payer: COMMERCIAL

## 2024-03-06 VITALS
WEIGHT: 274.69 LBS | TEMPERATURE: 98.3 F | HEART RATE: 89 BPM | SYSTOLIC BLOOD PRESSURE: 126 MMHG | OXYGEN SATURATION: 100 % | DIASTOLIC BLOOD PRESSURE: 72 MMHG | HEIGHT: 65 IN | BODY MASS INDEX: 45.77 KG/M2

## 2024-03-06 DIAGNOSIS — L91.8 SKIN TAG, ACQUIRED: Primary | ICD-10-CM

## 2024-03-06 PROCEDURE — 11200 RMVL SKIN TAGS UP TO&INC 15: CPT | Performed by: PHYSICIAN ASSISTANT

## 2024-03-06 PROCEDURE — 99213 OFFICE O/P EST LOW 20 MIN: CPT | Performed by: PHYSICIAN ASSISTANT

## 2024-03-06 RX ORDER — AZITHROMYCIN 250 MG/1
500 TABLET, FILM COATED ORAL EVERY 24 HOURS
COMMUNITY

## 2024-03-06 NOTE — PROGRESS NOTES
"Assessment/Plan:       1. Skin tag, acquired        Subjective:      Patient ID: Kena Dahl is a 49 y.o. female.    HPI    The following portions of the patient's history were reviewed and updated as appropriate: allergies, current medications, past family history, past medical history, past social history, past surgical history, and problem list.    Review of Systems      Objective:      /72 (BP Location: Right arm, Patient Position: Sitting, Cuff Size: Standard)   Pulse 89   Temp 98.3 °F (36.8 °C) (Oral)   Ht 5' 5\" (1.651 m)   Wt 125 kg (274 lb 11.1 oz)   SpO2 100%   BMI 45.71 kg/m²          Physical Exam    "

## 2024-03-06 NOTE — PROGRESS NOTES
"Name: Kena Dahl      : 1975      MRN: [id number]  Encounter Provider: Gogo Matt PA-C  Encounter Date: 3/6/2024   Encounter department: [facility]    Assessment & Plan    1. Skin tag, acquired    Patient requests skin tag removal on the left anterior neck.  This area is irritated and painful due to a necklace that she had been wearing.  Patient forms a lot of skin tags but this is the only one that is irritating to her.  Subjective    HPI patient requesting removal of irritated skin tag.  Skin tag was irritated by the necklace rubbing against it.    Objective    /72 (BP Location: Right arm, Patient Position: Sitting, Cuff Size: Standard)   Pulse 89   Temp 98.3 °F (36.8 °C) (Oral)   Ht 5' 5\" (1.651 m)   Wt 125 kg (274 lb 11.1 oz)   SpO2 100%   BMI 45.71 kg/m²      Skin tag removal    Date/Time: 3/6/2024 3:20 PM    Performed by: Gogo Matt PA-C  Authorized by: Gogo Matt PA-C  Universal Protocol:  Consent: Verbal consent obtained. Written consent not obtained.  Risks and benefits: risks, benefits and alternatives were discussed  Consent given by: patient  Time out: Immediately prior to procedure a \"time out\" was called to verify the correct patient, procedure, equipment, support staff and site/side marked as required.  Patient understanding: patient states understanding of the procedure being performed  Patient consent: the patient's understanding of the procedure matches consent given  Procedure consent: procedure consent matches procedure scheduled  Relevant documents: relevant documents present and verified  Test results: test results available and properly labeled  Site marked: the operative site was not marked  Radiology Images displayed and confirmed. If images not available, report reviewed: imaging studies available  Required items: required blood products, implants, devices, and special equipment " available  Patient identity confirmed: verbally with patient      Procedure Details - Skin Tag Destruction:     Up to 15      Body area:  Head/neck    Head/neck location:  Neck    Initial size (mm):  2    Final defect size (mm):  0    Malignancy: benign lesion      Destruction method: scissors used for extraction    Lesion 6:       no concerns     patient requested skin tag removal due to irritation.  No signs of infection.  4-0 nylon tied around base of skin tag to strangulate the tag.  Tag was cut with sharp scissors and suture was then cut.  Band-Aid applied.  No signs of bleeding.  Patient tolerated procedure without difficulty.  Gogo Matt PA-C

## 2024-03-20 ENCOUNTER — OFFICE VISIT (OUTPATIENT)
Dept: FAMILY MEDICINE CLINIC | Facility: CLINIC | Age: 49
End: 2024-03-20
Payer: COMMERCIAL

## 2024-03-20 VITALS
DIASTOLIC BLOOD PRESSURE: 70 MMHG | TEMPERATURE: 98 F | SYSTOLIC BLOOD PRESSURE: 110 MMHG | OXYGEN SATURATION: 100 % | HEART RATE: 89 BPM | HEIGHT: 65 IN | WEIGHT: 276.46 LBS | BODY MASS INDEX: 46.06 KG/M2

## 2024-03-20 DIAGNOSIS — Z00.00 ANNUAL PHYSICAL EXAM: Primary | ICD-10-CM

## 2024-03-20 DIAGNOSIS — F41.1 GAD (GENERALIZED ANXIETY DISORDER): ICD-10-CM

## 2024-03-20 DIAGNOSIS — F51.01 PRIMARY INSOMNIA: ICD-10-CM

## 2024-03-20 DIAGNOSIS — Z72.0 TOBACCO USE: ICD-10-CM

## 2024-03-20 DIAGNOSIS — L65.0 TELOGEN EFFLUVIUM: ICD-10-CM

## 2024-03-20 DIAGNOSIS — E66.01 CLASS 3 SEVERE OBESITY DUE TO EXCESS CALORIES WITHOUT SERIOUS COMORBIDITY WITH BODY MASS INDEX (BMI) OF 45.0 TO 49.9 IN ADULT (HCC): ICD-10-CM

## 2024-03-20 PROBLEM — E66.813 CLASS 3 SEVERE OBESITY DUE TO EXCESS CALORIES WITHOUT SERIOUS COMORBIDITY WITH BODY MASS INDEX (BMI) OF 45.0 TO 49.9 IN ADULT (HCC): Status: ACTIVE | Noted: 2024-03-20

## 2024-03-20 PROCEDURE — 99213 OFFICE O/P EST LOW 20 MIN: CPT | Performed by: PHYSICIAN ASSISTANT

## 2024-03-20 PROCEDURE — 99396 PREV VISIT EST AGE 40-64: CPT | Performed by: PHYSICIAN ASSISTANT

## 2024-03-20 PROCEDURE — 99406 BEHAV CHNG SMOKING 3-10 MIN: CPT | Performed by: PHYSICIAN ASSISTANT

## 2024-03-20 RX ORDER — NALTREXONE HYDROCHLORIDE 50 MG/1
TABLET, FILM COATED ORAL
Qty: 21 TABLET | Refills: 0 | Status: SHIPPED | OUTPATIENT
Start: 2024-03-20 | End: 2024-03-28 | Stop reason: SDUPTHER

## 2024-03-20 RX ORDER — BUPROPION HYDROCHLORIDE 100 MG/1
TABLET ORAL
Qty: 70 TABLET | Refills: 0 | Status: SHIPPED | OUTPATIENT
Start: 2024-03-20 | End: 2024-03-28 | Stop reason: SDUPTHER

## 2024-03-20 NOTE — PATIENT INSTRUCTIONS
Bupropion 100 mg   Week 1: 1 tablet (100 mg) each morning   Week 2: 1 tablet (100 mg) AM and 1 tablet (100 mg) PM   Week 3: 2 tablets (200 mg) AM  and 1 tablet (100 mg) PM   Week 4: 2 tablets (200 mg) AM  and 2 tablets (200 mg) PM     Naltrexone 50 mg   Week 1 & 2: 1/2 tablet (25 mg total) daily   Week 3 and on: 1 tablet (50 mg) daily

## 2024-03-20 NOTE — PROGRESS NOTES
Assessment/Plan:       1. Annual physical exam    2. CHARLES (generalized anxiety disorder)    3. Primary insomnia    4. BMI 45.0-49.9, adult (HCC)  -     buPROPion (WELLBUTRIN) 100 mg tablet; Week 1: 1 tablet in AM.  Week 2: 1 tablet in a.m. and 1 tablet in p.m.  Week 3: 2 tablets in a.m. and 1 tablet in PM.  Week 4: 2 tabs in a.m. and 2 tabs in p.m.  -     naltrexone (REVIA) 50 mg tablet; Week 1 and two 1/2 tablet in a.m.  Week 3 and thereafter 1 tablet in a.m.    5. Telogen effluvium    6. Class 3 severe obesity due to excess calories without serious comorbidity with body mass index (BMI) of 45.0 to 49.9 in adult ([facility])  -     buPROPion (WELLBUTRIN) 100 mg tablet; Week 1: 1 tablet in AM.  Week 2: 1 tablet in a.m. and 1 tablet in p.m.  Week 3: 2 tablets in a.m. and 1 tablet in PM.  Week 4: 2 tabs in a.m. and 2 tabs in p.m.  -     naltrexone (REVIA) 50 mg tablet; Week 1 and two 1/2 tablet in a.m.  Week 3 and thereafter 1 tablet in a.m.     This 49-year-old female sees me for her primary care services.  We discussed her recent lab findings.  She has a 3.3% ASCVD risk.  Her hemoglobin A1c is at 5.6%.  Patient was to have a colonoscopy but it got canceled by Dr. Bob and is in the process of getting rescheduled.  She is having her mammogram in less than a week.  She is still smoking but has decreased the intake of cigarettes from 1 pack/day to 1/3 pack/day.  We talked for 3 minutes about the benefits of becoming a lifetime non-smoker.  We discussed Chantix and Zyban.    Patient is very frustrated over her inability to lose weight.  She has been trying for several months and has actually gained 2 pounds since her last appointment.  Patient admits to having a lot of stress and being an emotional eater.  As part of shared decision making, she is willing to try the generic equivalent of Contrave.  We spent 10 minutes discussing how to take this medication and its mechanism of action.  I have also given her tips for weight  loss for the future.  She is going to see me in 1 month to discuss her response to the naltrexone/bupropion.    Patient is overcoming a recent upper respiratory tract infection.  She has continued to cough and I told her that she could expect a cough for up to a month as a postviral cough syndrome typically occurs.  She is bringing up very clear mucus so she does not think there is any infection.  She has no fever or chills.    She is concerned about hair loss.  She does see a relationship between the hair loss and stress in her life.  I explained telogen effluvium to her.  She does have a baseline generalized anxiety for which she is taking Zoloft which she thinks is helping.    In addition to the history and physical annual exam today, we did discuss several medical conditions.    A total of 50 minutes was spent rendering care for this patient.  This time included review of the patient's electronic medical record, performing the history and physical, reviewing appropriate labs and/or images, developing a treatment and assessment plan, answering patient's questions and concerns, and documenting the patient visit.  I will see the patient in 1 month to discuss response to treatment for her obesity and assess status of her telogen effluvium.    Subjective:      Patient ID: Kena Dahl is a 49 y.o. female.    HPI: Well-developed well-nourished pleasant 49-year-old female.  I recently took off a skin tag on her neck and she is extremely pleased with the results.  She continues to have difficulty sleeping which is probably related to her anxiety.  She is increasing her physical activity.  She is looking forward to the days getting longer and having more light so that she can have more activity.    She continues to cough related to her recent upper respiratory infection.  No fever or chills and no sputum production.    No changes in her bowel habits.  Her mammogram and colonoscopy are both pending.    The following  "portions of the patient's history were reviewed and updated as appropriate: allergies, current medications, past family history, past medical history, past social history, past surgical history, and problem list.    Review of Systems patient has gained 2 pounds since her last appointment.  She is recovering from an upper respiratory tract infection and does not seem to have any long-term effects.    Objective:      /70 (BP Location: Right arm, Patient Position: Sitting, Cuff Size: Standard)   Pulse 89   Temp 98 °F (36.7 °C) (Tympanic)   Ht 5' 5\" (1.651 m)   Wt 125 kg (276 lb 7.3 oz)   SpO2 100%   BMI 46.00 kg/m²          Physical Exam well-developed well-nourished pleasant 49-year-old female who is alert and oriented.  She is normotensive.  Blood pressure is appropriate.    Well-developed well-nourished 49 y.o. year old female who is cooperative with the exam.  Patient is alert and oriented x3.  Patient is appropriate in answering all questions.    HEENT:  Normocephalic.  PERRLA.  EOMs intact.  TMs are clear with identification of bony landmarks.  No tragus or pinnae tenderness.  No pre or posterior auricular adenopathy.  Sinuses without tenderness.  No TMJ.  Gifford test did not lateralize.  Keara test showed AC greater than BC bilaterally.  Throat without hyperemia.  Neck:  Supple without adenopathy.  Thyroid midline without thyromegaly or bruits.  No carotid bruits.  Chest symmetric and nontender.  Heart regular rate and rhythm.  No murmur rubs or gallops.  Point of maximum impulse not displaced.  Lungs are clear to auscultation.  Breathing is nonlabored.  Aerating bases well.  Abdomen round and soft positive bowel sounds without masses tenderness or organomegaly.  Extremities reveal adequate peripheral pulses without peripheral edema.  "

## 2024-03-21 ENCOUNTER — HOSPITAL ENCOUNTER (OUTPATIENT)
Dept: RADIOLOGY | Facility: CLINIC | Age: 49
End: 2024-03-21
Payer: COMMERCIAL

## 2024-03-21 VITALS — HEIGHT: 65 IN | WEIGHT: 276 LBS | BODY MASS INDEX: 45.98 KG/M2

## 2024-03-21 DIAGNOSIS — Z12.31 ENCOUNTER FOR SCREENING MAMMOGRAM FOR BREAST CANCER: ICD-10-CM

## 2024-03-21 PROCEDURE — 77063 BREAST TOMOSYNTHESIS BI: CPT

## 2024-03-21 PROCEDURE — 77067 SCR MAMMO BI INCL CAD: CPT

## 2024-03-27 ENCOUNTER — TELEPHONE (OUTPATIENT)
Dept: FAMILY MEDICINE CLINIC | Facility: CLINIC | Age: 49
End: 2024-03-27

## 2024-03-27 NOTE — TELEPHONE ENCOUNTER
Called Kena to see if she will like a referral put in to get her colorectal cancer screening done. Patient didn't answer/ left a voicemail.

## 2024-03-28 ENCOUNTER — TELEPHONE (OUTPATIENT)
Age: 49
End: 2024-03-28

## 2024-03-28 DIAGNOSIS — E66.01 CLASS 3 SEVERE OBESITY DUE TO EXCESS CALORIES WITHOUT SERIOUS COMORBIDITY WITH BODY MASS INDEX (BMI) OF 45.0 TO 49.9 IN ADULT (HCC): ICD-10-CM

## 2024-03-28 RX ORDER — BUPROPION HYDROCHLORIDE 100 MG/1
TABLET ORAL
Qty: 70 TABLET | Refills: 0 | Status: SHIPPED | OUTPATIENT
Start: 2024-03-28

## 2024-03-28 RX ORDER — NALTREXONE HYDROCHLORIDE 50 MG/1
TABLET, FILM COATED ORAL
Qty: 21 TABLET | Refills: 0 | Status: SHIPPED | OUTPATIENT
Start: 2024-03-28

## 2024-03-28 NOTE — TELEPHONE ENCOUNTER
Patient reports Revia is out of stock at her pharmacy and they suggested Topamax as an alternative. She was advised to take the Revia with the Wellbutrin and wanted an acceptable alternative before picking up the Wellbutrin. Patient would like a call back to advise. Okay to leave a detailed message if she does not answer.

## 2024-03-28 NOTE — TELEPHONE ENCOUNTER
I called and spoke to patient.  The naltrexone is on backorder and [facility] did not know when they were going to receive it.  I resent the prescription for naltrexone and bupropion to [facility] in [place] and spoke to the patient.    Sal

## 2024-04-10 ENCOUNTER — OFFICE VISIT (OUTPATIENT)
Dept: FAMILY MEDICINE CLINIC | Facility: CLINIC | Age: 49
End: 2024-04-10
Payer: COMMERCIAL

## 2024-04-10 VITALS
SYSTOLIC BLOOD PRESSURE: 118 MMHG | HEART RATE: 84 BPM | OXYGEN SATURATION: 98 % | WEIGHT: 268.96 LBS | BODY MASS INDEX: 44.76 KG/M2 | TEMPERATURE: 101.9 F | DIASTOLIC BLOOD PRESSURE: 74 MMHG

## 2024-04-10 DIAGNOSIS — L65.0 TELOGEN EFFLUVIUM: ICD-10-CM

## 2024-04-10 DIAGNOSIS — N95.1 PERIMENOPAUSAL: Primary | ICD-10-CM

## 2024-04-10 DIAGNOSIS — J30.2 SEASONAL ALLERGIES: ICD-10-CM

## 2024-04-10 DIAGNOSIS — F41.1 GAD (GENERALIZED ANXIETY DISORDER): ICD-10-CM

## 2024-04-10 DIAGNOSIS — F51.01 PRIMARY INSOMNIA: ICD-10-CM

## 2024-04-10 DIAGNOSIS — H69.92 DYSFUNCTION OF LEFT EUSTACHIAN TUBE: ICD-10-CM

## 2024-04-10 DIAGNOSIS — J10.1 INFLUENZA A: ICD-10-CM

## 2024-04-10 DIAGNOSIS — Z72.0 TOBACCO USE: ICD-10-CM

## 2024-04-10 PROCEDURE — 99214 OFFICE O/P EST MOD 30 MIN: CPT | Performed by: PHYSICIAN ASSISTANT

## 2024-04-10 PROCEDURE — 99406 BEHAV CHNG SMOKING 3-10 MIN: CPT | Performed by: PHYSICIAN ASSISTANT

## 2024-04-10 RX ORDER — OSELTAMIVIR PHOSPHATE 75 MG/1
75 CAPSULE ORAL EVERY 12 HOURS SCHEDULED
Qty: 10 CAPSULE | Refills: 0 | Status: SHIPPED | OUTPATIENT
Start: 2024-04-10 | End: 2024-04-15

## 2024-04-10 RX ORDER — TRAZODONE HYDROCHLORIDE 100 MG/1
100 TABLET ORAL
Qty: 30 TABLET | Refills: 5 | Status: SHIPPED | OUTPATIENT
Start: 2024-04-10

## 2024-04-10 NOTE — PROGRESS NOTES
Assessment/Plan:       1. Perimenopausal    2. Primary insomnia  -     traZODone (DESYREL) 100 mg tablet; Take 1 tablet (100 mg total) by mouth daily at bedtime    3. CHARLES (generalized anxiety disorder)    4. Telogen effluvium    5. Tobacco use    6. Seasonal allergies    7. Influenza A  -     oseltamivir (TAMIFLU) 75 mg capsule; Take 1 capsule (75 mg total) by mouth every 12 (twelve) hours for 5 days    8. Dysfunction of left eustachian tube     This 49-year-old female sees me for her primary care services.  Patient is complaining of an abrupt onset of chills and inability to get warm and fever of 101 x 1 day.  She is having a lot of pain in her left ear.  She also has some pain with swallowing on the left side.  Her mucus has gone from clear to yellow and thick.  She has a history of some seasonal allergies and has been taking Flonase in the past.  This does not seem to be working for her.  She did try some Afrin nasal spray this morning to try to clear up her left ear.  She indeed does have some eustachian tube dysfunction on the left side and I let her know that she could use the Afrin or Javier-Synephrine nasal spray for total of 3 days and then would need a 3-day holiday.    Patient has lost 8 pounds on her own.  On a previous exam, she was agreeable to taking naltrexone with bupropion but the naltrexone is on backorder and will not be in for several weeks.  She is very pleased that she was able to lose the weight on her own.    Patient has generalized anxiety.  Her score is gone from 18-15.  She admits that she has not been taking the sertraline yet but is planning on starting it in the near future.    Patient was having low-grade fever today in the office also.  Patient feels a lot of myalgias and feels as if she was hit by a truck and the symptoms occurred abruptly.  She is coughing and her chest is sore from coughing as a sore throat.  With the abrupt onset of symptoms fever chills and respiratory complaints,  it is likely that the patient has influenza A.  As part of shared decision making, she is willing to accept Tamiflu for this condition.  She is working from home and she is not infective for others.  I did give her off today and tomorrow with return to work potentially on April 12.  She is going to contact me if she is not ready to come back to work.    Patient feels that her work as a  for [employer] is going very well and she is upset with management with trying to change the entire system of how she does her job.  She feels that they are having a lot of success with the current process and is reluctant to try something that is not totally proven to work.    Patient is having difficulty getting to sleep.  She has been trying to distract herself by watching television in order to try to get some sleep but this is actually causing her to be wide-awake.    Patient continues to smoke.  She is smoking 1/2 pack a per day x 30 years.  I spent 3 minutes talking about smoking cessation and especially now important is to stop smoking in the setting of her being ill with a respiratory illness.  She is still in the precontemplation stage for stopping smoking.    A total of 30 minutes was spent rendering care for this patient.  This time included review of the patient's electronic medical record, performing the history and physical, reviewing appropriate labs and/or images, developing a treatment and assessment plan, answering patient's questions and concerns, and documenting the patient visit.  I will see the patient in 4 weeks to assess her response to weight loss medications and her response and recovery from influenza A.    Subjective:      Patient ID: Kena Dahl is a 49 y.o. female.    HPI: 49-year-old female who does appear quite ill.  She is having fever presently.  She has some mild episodes of coughing when seen in the office today.  Her left ear is continuing to cause her some pain and she has a  fullness on the left side of her neck and difficulty swallowing.  Her symptoms became abrupt very quickly.  She has not been around anyone else who has been sick.    Her chest is sore from coughing.  Throat is sore from coughing.  Has no rash.  No nuchal rigidity.  No changes in her bowel or bladder habits.    The following portions of the patient's history were reviewed and updated as appropriate: allergies, current medications, past family history, past medical history, past social history, past surgical history, and problem list.    Review of Systems currently battling an upper respiratory infection that is consistent with influenza A.    Objective:      /74 (BP Location: Right arm, Patient Position: Sitting, Cuff Size: Standard)   Pulse 84   Temp (!) 101.9 °F (38.8 °C) (Tympanic)   Wt 122 kg (268 lb 15.4 oz)   SpO2 98%   BMI 44.76 kg/m²          Physical Exam ill-appearing 49-year-old female who is cooperative with the exam.    Temperature is elevated at 101.9.  Blood pressure is appropriate.  Pulse is 84.    She has no conjunctival inflammation.  She has some fullness on the left ear.  Right ear is clear.  No pre or posterior auricular adenopathy.  No pain on pressure on the pinna or tragus.  No nuchal adenopathy.  Throat shows a lot of postnasal drainage.  No tonsillar hypertrophy.  Throat is somewhat red probably from coughing.  She has no sinus tenderness on palpation of the maxillary or frontal sinuses.    Heart is regular rate without murmur rub or gallops.  Lungs are clear to auscultation.  Patient able to speak in full sentences.  No rales rhonchi or wheezing heard.  Aerating bases without difficulty.  Not using accessory muscles of respiration.

## 2024-04-12 ENCOUNTER — TELEPHONE (OUTPATIENT)
Age: 49
End: 2024-04-12

## 2024-04-12 NOTE — TELEPHONE ENCOUNTER
Attempted to contact Kena to let her know that Shaka has expended her work note as requested. Patient didn't answer/ left a voicemail.

## 2024-04-12 NOTE — TELEPHONE ENCOUNTER
Pt still not feeling well, cannot swallow still. Could we please edit existing work note to day ok to return on Monday? Pt will look for edited letter on [employer]. When complete could we either call her or send her a [employer] message to let her know its done?

## 2024-04-12 NOTE — TELEPHONE ENCOUNTER
This is being completed for the patient.  Please contact the patient to let her know that it is done.    Sal

## 2024-07-31 ENCOUNTER — OFFICE VISIT (OUTPATIENT)
Dept: FAMILY MEDICINE CLINIC | Facility: CLINIC | Age: 49
End: 2024-07-31
Payer: COMMERCIAL

## 2024-07-31 VITALS
OXYGEN SATURATION: 97 % | HEART RATE: 106 BPM | TEMPERATURE: 97.8 F | BODY MASS INDEX: 44.54 KG/M2 | WEIGHT: 267.64 LBS | SYSTOLIC BLOOD PRESSURE: 128 MMHG | DIASTOLIC BLOOD PRESSURE: 54 MMHG

## 2024-07-31 DIAGNOSIS — Z12.12 ENCOUNTER FOR COLORECTAL CANCER SCREENING: ICD-10-CM

## 2024-07-31 DIAGNOSIS — F41.1 GAD (GENERALIZED ANXIETY DISORDER): ICD-10-CM

## 2024-07-31 DIAGNOSIS — K57.92 DIVERTICULITIS: ICD-10-CM

## 2024-07-31 DIAGNOSIS — Z80.0 FAMILY HISTORY OF COLON CANCER IN FATHER: ICD-10-CM

## 2024-07-31 DIAGNOSIS — Z72.0 TOBACCO USE: ICD-10-CM

## 2024-07-31 DIAGNOSIS — J30.2 SEASONAL ALLERGIES: Primary | ICD-10-CM

## 2024-07-31 DIAGNOSIS — H69.92 DYSFUNCTION OF LEFT EUSTACHIAN TUBE: ICD-10-CM

## 2024-07-31 DIAGNOSIS — Z12.11 ENCOUNTER FOR COLORECTAL CANCER SCREENING: ICD-10-CM

## 2024-07-31 DIAGNOSIS — F51.01 PRIMARY INSOMNIA: ICD-10-CM

## 2024-07-31 PROCEDURE — 99214 OFFICE O/P EST MOD 30 MIN: CPT | Performed by: PHYSICIAN ASSISTANT

## 2024-07-31 PROCEDURE — 3725F SCREEN DEPRESSION PERFORMED: CPT | Performed by: PHYSICIAN ASSISTANT

## 2024-07-31 RX ORDER — FLUTICASONE PROPIONATE 50 MCG
1 SPRAY, SUSPENSION (ML) NASAL DAILY
Qty: 18.2 ML | Refills: 3 | Status: SHIPPED | OUTPATIENT
Start: 2024-07-31

## 2024-07-31 NOTE — PROGRESS NOTES
Assessment/Plan:       1. Seasonal allergies  -     fluticasone (FLONASE) 50 mcg/act nasal spray; 1 spray into each nostril daily  2. Primary insomnia  3. Tobacco use  4. CHARLES (generalized anxiety disorder)  5. Diverticulitis  6. Dysfunction of left eustachian tube  7. Encounter for colorectal cancer screening  -     Ambulatory Referral to Gastroenterology; Future  8. Family history of colon cancer in father  -     Ambulatory Referral to Gastroenterology; Future      This 49-year-old female sees me for her primary care services.  I last saw the patient in April.  At that time, she wanted to lose weight and I put her on the generic equivalent of Contrave.  When patient went to pick it up at the pharmacy, she was told that this medication is used for drug addicts.  This scared her and she decided to not take the medication.  Patient also started questioning the other medications and she therefore did not take any of her prescribed medications.  Patient feels that she is doing okay from a mental health standpoint.  Her PHQ 2 score was a score of 1 showing she does not have depression.    Patient is having problems with anxiety.  Her CHARLES 7 score had been at 15 and is now at 7.  She is pleased with being able to control her anxiety to a greater extent.  She never took the Zoloft and she is doing well and coping without the need for any other medication.  She is hesitant to take any of her medications at this point so she is only using CBD Gummies in order to help her insomnia.    Patient continues to have disordered sleep.  She has trouble going to sleep and staying asleep.  Even when she is not sleeping, she tends to have a lot of extra energy and does not feel sleep deprived.    She is considering getting a medical marijuana card for this reason.  She has not yet applied to get this card.    Patient has lost 9 pounds since March of this year.  Since April, it was only 1 pound weight loss.  She states that she is being  much more active.    Patient is complaining of left ear pain.  She had the same pain in April and was diagnosed with serous otitis media with left eustachian tube dysfunction.  She had tried Afrin or Javier-Synephrine nasal spray and had a good result with this.  I also had suggested Flonase nasal spray and went over again how to administer this medication.  Patient states that she would like this prescribed so that insurance can help to pay for it so I wrote her the prescription for Flonase.    Patient has had some congestion and sneezing over the last several days.  I let her know that the Flonase may be very helpful in that regard.  She is going to try this medication.  She is not having any fever or chills.  She has a lot of postnasal drainage in the morning when she is showering and she coughs up a lot of the drainage first thing in the morning.    Patient states that she has a large network of people to support her and she enjoys spending time with her grandchildren.    We went over her most recent labs.  Her CMP is uneventful.  Her mammogram was normal.  Her lipid profile predicts only at 3.6% chance of having an acute cardiovascular event in the next 10 years.    We had tried to get a colonoscopy for her as she is very high risk with her father having a history of colon cancer.  I did tell her I would not be comfortable ordering a Cologuard because Cologuard is only intended for normal risk individuals.  Patient states that she had an appointment with Dr. Bob and that the office canceled the appointment.  She states that the office told her that they would be rescheduling the appointment but to date, this has not been done.  She is upset that the ball was dropped and she still has not had her colonoscopy done.  I have given her another referral and told her she may do better going to [facility] and that Dr. Clemente may have some availability.    Patient states that she has had flares of diverticulitis in the past.   She asked for a prescription for antibiotics whenever the diverticulitis flares.  I told her I was not comfortable issuing a prescription because this could have serious ramifications and that I would need to see her.  She states that she will call me in the future.    A total of 30 minutes was spent rendering care for this patient.  This time included review of the patient's electronic medical record, performing the history and physical, reviewing appropriate labs and/or images, developing a treatment and assessment plan, answering patient's questions and concerns, and documenting the patient visit.  I will see the patient in 4 months or sooner if needed.  Subjective:      Patient ID: Kena Dahl is a 49 y.o. female.    HPI: Well-developed well-nourished gregarious 49-year-old female who was accompanied in the office by her friend at the patient's request.  She is seen for an acute care visit because of ongoing left ear pain.  We ended up having a more detailed visit at the patient request as she had multiple concerns that she wanted discussed today at the visit.  She went off all of her medications.  She states that after the pharmacist told her that the naltrexone was only intended for drug addicts, she questioned all of the medications that had been given and decided not to take anything.    She is denying pain in her chest heart palpitations dizziness or lightheadedness she is not having any hot flashes.  Her mother was in her late 50s when she went through menopause.  She is not having difficulty with concentration.  She is having problems with insomnia.  She opted not to take the trazodone for the insomnia and is instead taking another person CBD Gummies.  She does not want any medicine for her insomnia and the patient is considering getting a medical marijuana card.    No recent changes in her bowel habits.  She states her diverticulitis tends to flareup every several months.  No difficulty passing her  water.    The following portions of the patient's history were reviewed and updated as appropriate: allergies, current medications, past family history, past medical history, past social history, past surgical history, and problem list.    Review of Systems having issues with seasonal allergies at this time.    Objective:      /54 (BP Location: Left arm, Patient Position: Sitting, Cuff Size: Large)   Pulse (!) 106   Temp 97.8 °F (36.6 °C) (Tympanic)   Wt 121 kg (267 lb 10.2 oz)   SpO2 97%   BMI 44.54 kg/m²          Physical Exam reviewed vital signs.  She is normotensive and afebrile.    Patient's left ear shows signs of a serous otitis media which had been seen in the past.  No redness or bulging of her eardrum.  Right TM is clear.  Gifford test lateralized to the left.  Keara test showed AC greater than BC bilaterally.  She has no sinus tenderness.  Postnasal drainage was noted on her throat exam.  No tonsillar adenopathy and no other nuchal adenopathy noted.    Patient's heart is regular rate without murmur rub or gallops.  Lungs are clear to auscultation.

## 2024-08-16 ENCOUNTER — TELEPHONE (OUTPATIENT)
Dept: FAMILY MEDICINE CLINIC | Facility: CLINIC | Age: 49
End: 2024-08-16

## 2024-10-03 ENCOUNTER — OFFICE VISIT (OUTPATIENT)
Dept: FAMILY MEDICINE CLINIC | Facility: CLINIC | Age: 49
End: 2024-10-03
Payer: COMMERCIAL

## 2024-10-03 VITALS
BODY MASS INDEX: 44.85 KG/M2 | DIASTOLIC BLOOD PRESSURE: 54 MMHG | HEIGHT: 65 IN | WEIGHT: 269.18 LBS | SYSTOLIC BLOOD PRESSURE: 128 MMHG

## 2024-10-03 DIAGNOSIS — H69.92 DYSFUNCTION OF LEFT EUSTACHIAN TUBE: Primary | ICD-10-CM

## 2024-10-03 DIAGNOSIS — J30.2 SEASONAL ALLERGIES: ICD-10-CM

## 2024-10-03 PROCEDURE — 99213 OFFICE O/P EST LOW 20 MIN: CPT | Performed by: PHYSICIAN ASSISTANT

## 2024-10-03 RX ORDER — PREDNISONE 20 MG/1
TABLET ORAL
COMMUNITY
Start: 2024-09-23 | End: 2024-10-03 | Stop reason: ALTCHOICE

## 2024-10-03 NOTE — PROGRESS NOTES
Assessment/Plan:       1. Dysfunction of left eustachian tube  2. Seasonal allergies      49-year-old female who sees me for her primary care services.  Patient had been seen in urgent care with left ear pain.  She was evaluated and was told that she had an infection in her left ear.  She was given prednisone and Augmentin.  I had seen her in the interim and did not find any additional problems but told her she may have issues with her eustachian tube and I suggested Flonase nasal spray which she has been using.    Patient took 6 of the 7 days of the Augmentin finished the prednisone.  She does not feel as if her condition has been alleviated.  She is using knee Flonase correctly spraying it into the ostiomeatal complex.  She appears to have issues with eustachian tube blockage on the left side.  Examination is consistent with eustachian tube dysfunction.  When performing the Toynbee maneuver, she can hear cracking in the right ear which is the clear ear for her.    As part of shared decision making, she is willing to use the generic form of Afrin or Javier-Synephrine nasal spray.  She is going to escorted straight upward very forcefully so that her eustachian tube can be opened.  She is going to use this for period of 3 days and then take a 3-day holiday.  I am having her avoid the oral antihistamines because of the chance of thickening of the secretions.    For her seasonal allergies, she will need to continue to use the Flonase nasal spray until the first heavy frost of the year.  We went over the mechanism by which allergies occur and I was able to answer all of her questions.  She is not having fever or chills.    A total of 20 minutes was spent rendering care for this patient.  This time included review of the patient's electronic medical record, performing the history and physical, reviewing appropriate labs and/or images, developing a treatment and assessment plan, answering patient's questions and concerns,  "and documenting the patient visit.  I will see the patient in 2 weeks in order to perform a pelvic exam by her request.  Patient states that she has had hysterectomy in the past but has  tested positive for bacterial vaginosis she would like to be rechecked.  Subjective:      Patient ID: Kena Dahl is a 49 y.o. female.    HPI: Well-developed well-nourished 49-year-old pleasant female who has a nontoxic appearance.  She was having intermittent coughing throughout the duration of the visit.  She is not coughing anything up.  She states that her voice feels rather raspy.  She continues to have clogging and fullness in the left ear.    She denies pain in her chest heart palpitations dizziness or lightheadedness.  No change in bowel or bladder habits.    The following portions of the patient's history were reviewed and updated as appropriate: allergies, current medications, past family history, past medical history, past social history, past surgical history, and problem list.    Review of Systems symptomatic with seasonal allergies but not actual upper respiratory infection.    Objective:      /54 (BP Location: Left arm, Patient Position: Sitting, Cuff Size: Standard)   Ht 5' 5\" (1.651 m)   Wt 122 kg (269 lb 2.9 oz)   BMI 44.79 kg/m²          Physical Exam reviewed vital signs.  She is normotensive.    The Gifford test lateralized to the left.  Keara test showed AC greater than BC bilaterally hearing was unaffected.  Left TM showed fullness consistent with serous otitis but no redness was noted.  Right TM was clear.  No tenderness on pressure on the tragus or pulling of the pinna.    No sinus tenderness on palpation.  Throat revealed postnasal drainage.    Heart is regular rate without murmur rub or gallops.  Lungs are clear to auscultation.  Aerating bases well without using accessory muscles of respiration.  Able to speak in full sentences without dyspnea.  "

## 2024-10-16 ENCOUNTER — OFFICE VISIT (OUTPATIENT)
Dept: FAMILY MEDICINE CLINIC | Facility: CLINIC | Age: 49
End: 2024-10-16

## 2024-10-16 VITALS
HEART RATE: 80 BPM | TEMPERATURE: 98.6 F | OXYGEN SATURATION: 98 % | SYSTOLIC BLOOD PRESSURE: 130 MMHG | DIASTOLIC BLOOD PRESSURE: 85 MMHG | WEIGHT: 268.3 LBS | BODY MASS INDEX: 44.65 KG/M2

## 2024-10-16 DIAGNOSIS — Z11.3 SCREENING EXAMINATION FOR STI: ICD-10-CM

## 2024-10-16 DIAGNOSIS — T28.1XXA ESOPHAGEAL BURN, INITIAL ENCOUNTER: Primary | ICD-10-CM

## 2024-10-16 PROCEDURE — 87491 CHLMYD TRACH DNA AMP PROBE: CPT | Performed by: PHYSICIAN ASSISTANT

## 2024-10-16 PROCEDURE — 87591 N.GONORRHOEAE DNA AMP PROB: CPT | Performed by: PHYSICIAN ASSISTANT

## 2024-10-16 NOTE — PROGRESS NOTES
Assessment/Plan:       1. Esophageal burn, initial encounter  2. Screening examination for STI      Patient is a 49-year-old female who sees me for her primary care services.  She is complaining of upper chest burning pain which is exacerbated by bending over.  She describes the pain as burning and tightening and she never had a pain like this in the past.  Pain sometimes radiates into the left shoulder.    Upon further questioning, the patient admitted to having a new Cajun spicy dish that she had never had in the past.  When eating the food, she did not note anything specifically but then she had significant burning pain the following day.  Patient did not have nausea or vomiting.  She has never had this dietary indiscretion in the past and plans on never eating this food again.    Patient states that she was involved in a relationship the past year and 3 months.  She states her relationship recently ended.  She wished to be checked for the various STIs.  She has a history of bacterial vaginosis that was treated in the past.  She does not have any discharge but did notice a different type of odor occasionally.    Patient underwent a MARY and right salpingo-oophorectomy in 2018 due to recurrent bleeding after ablation therapy.  No cancer was noted.  She came in today to have a pelvic exam and be tested for STIs and this was done for her.  I offered a breast exam but patient states she recently had mammogram performed and declined.    Patient was offered COVID and flu shots as part of today's visit and declined.    A total of 20 minutes was spent rendering care for this patient.  This time included review of the patient's electronic medical record, performing the history and physical, reviewing appropriate labs and/or images, developing a treatment and assessment plan, answering patient's questions and concerns, and documenting the patient visit.  I will see the patient in 6 months for her annual wellness exam.  She  previously had request for colonoscopy with Dr. Bob in July ordered.  Patient to follow-up with colonoscopy.  Subjective:      Patient ID: Kena Dahl is a 49 y.o. female.    HPI: Well-developed well-nourished 49-year-old female who is alert and oriented and cooperative with the exam.    She denies unusual vaginal discharge.  She had hysterectomy previously.  She denies breast pain.  Last sexual activity was 2 weeks ago.    No changes in bowel or bladder habits.    Esophageal burning as listed above.    The following portions of the patient's history were reviewed and updated as appropriate: allergies, current medications, past family history, past medical history, past social history, past surgical history, and problem list.    Review of Systems no recent URI or viral syndrome.    Objective:      /85 (BP Location: Right arm, Patient Position: Sitting, Cuff Size: Standard)   Pulse 80   Temp 98.6 °F (37 °C) (Tympanic)   Wt 122 kg (268 lb 4.8 oz)   SpO2 98%   BMI 44.65 kg/m²          Physical Exam reviewed vital signs.  She is cooperative and pleasant.  She is normotensive and afebrile.    Heart is regular rate without murmur rub or gallops.  Lungs are clear to auscultation.    Pelvic exam: normal external genitalia, vulva, vagina, cervix, uterus and adnexa, VULVA: normal appearing vulva with no masses, tenderness or lesions, VAGINA: normal appearing vagina with normal color and discharge, no lesions, DNA probe for chlamydia and GC obtained, ADNEXA: normal adnexa in size, nontender and no masses, surgically absent right, PAP: Pap smear done today, thin-prep method, DNA probe for chlamydia and GC obtained, exam chaperoned by Aby Rodriguez.    There was a normal physiologic discharge from the vaginal area.  No odor was noted.  Patient had some areas of skin irritation from previous shaving around the vulva.  No lesions noted around the introitus.

## 2024-10-18 LAB
C TRACH DNA SPEC QL NAA+PROBE: NEGATIVE
N GONORRHOEA DNA SPEC QL NAA+PROBE: NEGATIVE

## 2024-10-29 ENCOUNTER — TELEPHONE (OUTPATIENT)
Age: 49
End: 2024-10-29

## 2024-10-29 NOTE — TELEPHONE ENCOUNTER
Patient called and was confused why we did not test for more STD's and why the BV test was not completed either.  She stated that she thought the BV test was the most important.  She wants to make sure that's gone.  She does wants to be tested for all STD's.  Pls call and advise.  She will be at work and its ok to [facility]. [phone]

## 2024-11-07 ENCOUNTER — OFFICE VISIT (OUTPATIENT)
Dept: FAMILY MEDICINE CLINIC | Facility: CLINIC | Age: 49
End: 2024-11-07
Payer: COMMERCIAL

## 2024-11-07 VITALS
DIASTOLIC BLOOD PRESSURE: 70 MMHG | HEART RATE: 97 BPM | WEIGHT: 266.32 LBS | OXYGEN SATURATION: 98 % | BODY MASS INDEX: 44.37 KG/M2 | SYSTOLIC BLOOD PRESSURE: 122 MMHG | HEIGHT: 65 IN

## 2024-11-07 DIAGNOSIS — Z72.0 TOBACCO USE: ICD-10-CM

## 2024-11-07 DIAGNOSIS — J01.00 SUBACUTE MAXILLARY SINUSITIS: Primary | ICD-10-CM

## 2024-11-07 DIAGNOSIS — R09.82 POST-NASAL DRAINAGE: ICD-10-CM

## 2024-11-07 DIAGNOSIS — J34.89 THICK NASAL MUCUS: ICD-10-CM

## 2024-11-07 DIAGNOSIS — R51.9 SINUS HEADACHE: ICD-10-CM

## 2024-11-07 DIAGNOSIS — R68.83 CHILLS: ICD-10-CM

## 2024-11-07 PROCEDURE — 99214 OFFICE O/P EST MOD 30 MIN: CPT | Performed by: PHYSICIAN ASSISTANT

## 2024-11-07 RX ORDER — PREDNISONE 20 MG/1
20 TABLET ORAL 2 TIMES DAILY WITH MEALS
COMMUNITY
Start: 2024-10-20

## 2024-11-07 RX ORDER — AMOXICILLIN 500 MG/1
500 CAPSULE ORAL EVERY 8 HOURS SCHEDULED
Qty: 21 CAPSULE | Refills: 0 | Status: SHIPPED | OUTPATIENT
Start: 2024-11-07 | End: 2024-11-13 | Stop reason: SDUPTHER

## 2024-11-07 RX ORDER — KETOROLAC TROMETHAMINE 30 MG/ML
30 INJECTION, SOLUTION INTRAMUSCULAR; INTRAVENOUS ONCE
Status: COMPLETED | OUTPATIENT
Start: 2024-11-07 | End: 2024-11-07

## 2024-11-07 RX ADMIN — KETOROLAC TROMETHAMINE 30 MG: 30 INJECTION, SOLUTION INTRAMUSCULAR; INTRAVENOUS at 12:00

## 2024-11-07 NOTE — ASSESSMENT & PLAN NOTE
Patient has prescription for bupropion or Zyban.  I talked again to her today that this would be a good time for her to stop smoking.

## 2024-11-07 NOTE — PROGRESS NOTES
Ambulatory Visit  Name: Kena Dahl      : 1975      MRN: [id number]  Encounter Provider: Gogo Matt PA-C  Encounter Date: 2024   Encounter department: [facility]    Assessment & Plan  Thick nasal mucus  Patient has developed thick nasal mucus x 4 days.  She abruptly had a change in her nasal mucus with a very foul smell.  This led to the diagnosis of acute sinusitis and bacteria is suspected as a cause.  The change in mucus is most consistent with a viral to bacterial change requiring antibiotics.       Subacute maxillary sinusitis  Ill for 4 days.  Having a fever and chills.  Lots of sinus pressure and pain.  Nasal mucosa drainage is now foul-smelling.  With his double sickening, will start antibiotic therapy.  Caution to complete the entire antibiotic regimen and to take either a probiotic or yogurt with the antibiotic.  Orders:    amoxicillin (AMOXIL) 500 mg capsule; Take 1 capsule (500 mg total) by mouth every 8 (eight) hours for 7 days    Post-nasal drainage  Patient's drainage is changed to become even thicker and foul-smelling from her original clear drainage that occurred.  This double sickening is consistent with bacterial infection.       Chills  Concerned as to whether she is having the infection following systemic.  Antibiotics being ordered.       Sinus headache  Patient received Toradol as part of today's visit in order to break the cycle of her headache.  Orders:    ketorolac (TORADOL) 60 mg/2 mL IM injection 30 mg    Tobacco use  Patient has prescription for bupropion or Zyban.  I talked again to her today that this would be a good time for her to stop smoking.          History of Present Illness     HPI: History as above.  She has been sick for 4 days but has noticed a double sickening with a small improvement and then further exacerbation of her symptoms.  Continues to have severe sinus pressure headache.  Foul smell to her nasal  "drainage along with thickening.    Continues to cough as a result of throat irritation from postnasal drainage.  No soreness under her ears.  No nuchal rigidity or rash.  No chest pain.  He is not producing sputum when she is coughing.    History obtained from : patient  Review of Systems  Current Outpatient Medications on File Prior to Visit   Medication Sig Dispense Refill    fluticasone (FLONASE) 50 mcg/act nasal spray 1 spray into each nostril daily 18.2 mL 3    predniSONE 20 mg tablet Take 20 mg by mouth 2 (two) times a day with meals       No current facility-administered medications on file prior to visit.      Social History     Tobacco Use    Smoking status: Every Day     Current packs/day: 0.50     Types: Cigarettes    Smokeless tobacco: Never    Tobacco comments:     30 pk yr hx   Vaping Use    Vaping status: Never Used   Substance and Sexual Activity    Alcohol use: Yes     Comment: occasionally    Drug use: Yes     Types: Marijuana     Comment: once a month    Sexual activity: Not on file         Objective     /70 (BP Location: Right arm, Patient Position: Sitting, Cuff Size: Standard)   Pulse 97   Ht 5' 5\" (1.651 m)   Wt 121 kg (266 lb 5.1 oz)   SpO2 98%   BMI 44.32 kg/m²     Physical Exam: 49-year-old female who is coughing throughout the exam.  She appears ill but not toxic.  She is normotensive.  Pulse is 97.    No conjunctival inflammation.  TMs are without swelling.  No tenderness on pressure on the tragus or pulling on the pinna.  No pre or posterior auricular adenopathy.  Mild tenderness on palpation over the maxillary sinuses.  Throat slightly red with a lot of postnasal drainage.  No cervical adenopathy.    Heart is regular rate without murmurs rubs or gallops.  Lungs are clear to auscultation.  No egophony changes.  No areas of consolidation.  Aerating bases well without using accessory muscles of respiration.  Able to speak in full sentences.  Administrative Statements   I have " spent a total time of 30 minutes in caring for this patient on the day of the visit/encounter including Diagnostic results, Prognosis, Instructions for management, Patient and family education, Impressions, and Documenting in the medical record.

## 2024-11-13 ENCOUNTER — HOSPITAL ENCOUNTER (EMERGENCY)
Facility: HOSPITAL | Age: 49
Discharge: HOME | End: 2024-11-13
Attending: STUDENT IN AN ORGANIZED HEALTH CARE EDUCATION/TRAINING PROGRAM
Payer: COMMERCIAL

## 2024-11-13 ENCOUNTER — OFFICE VISIT (OUTPATIENT)
Dept: FAMILY MEDICINE CLINIC | Facility: CLINIC | Age: 49
End: 2024-11-13
Payer: COMMERCIAL

## 2024-11-13 VITALS
OXYGEN SATURATION: 99 % | SYSTOLIC BLOOD PRESSURE: 132 MMHG | HEART RATE: 92 BPM | DIASTOLIC BLOOD PRESSURE: 70 MMHG | BODY MASS INDEX: 44.26 KG/M2 | TEMPERATURE: 98.1 F | WEIGHT: 266 LBS

## 2024-11-13 VITALS
DIASTOLIC BLOOD PRESSURE: 83 MMHG | OXYGEN SATURATION: 100 % | HEART RATE: 98 BPM | RESPIRATION RATE: 16 BRPM | SYSTOLIC BLOOD PRESSURE: 177 MMHG | TEMPERATURE: 97.9 F

## 2024-11-13 DIAGNOSIS — R05.1 ACUTE COUGH: ICD-10-CM

## 2024-11-13 DIAGNOSIS — H69.92 DYSFUNCTION OF LEFT EUSTACHIAN TUBE: ICD-10-CM

## 2024-11-13 DIAGNOSIS — J06.9 VIRAL UPPER RESPIRATORY TRACT INFECTION: Primary | ICD-10-CM

## 2024-11-13 DIAGNOSIS — H72.91 PERFORATION OF RIGHT TYMPANIC MEMBRANE: Primary | ICD-10-CM

## 2024-11-13 DIAGNOSIS — H92.09 PAIN IN EAR: ICD-10-CM

## 2024-11-13 DIAGNOSIS — M79.10 MYALGIA: ICD-10-CM

## 2024-11-13 PROCEDURE — 99213 OFFICE O/P EST LOW 20 MIN: CPT | Performed by: PHYSICIAN ASSISTANT

## 2024-11-13 PROCEDURE — 99282 EMERGENCY DEPT VISIT SF MDM: CPT

## 2024-11-13 PROCEDURE — 99284 EMERGENCY DEPT VISIT MOD MDM: CPT | Performed by: STUDENT IN AN ORGANIZED HEALTH CARE EDUCATION/TRAINING PROGRAM

## 2024-11-13 RX ORDER — OXYCODONE HYDROCHLORIDE 5 MG/1
5 TABLET ORAL EVERY 6 HOURS PRN
Qty: 4 TABLET | Refills: 0 | Status: SHIPPED | OUTPATIENT
Start: 2024-11-13

## 2024-11-13 RX ORDER — OXYCODONE HYDROCHLORIDE 5 MG/1
5 TABLET ORAL ONCE
Refills: 0 | Status: COMPLETED | OUTPATIENT
Start: 2024-11-13 | End: 2024-11-13

## 2024-11-13 RX ORDER — NAPROXEN 250 MG/1
500 TABLET ORAL ONCE
Status: COMPLETED | OUTPATIENT
Start: 2024-11-13 | End: 2024-11-13

## 2024-11-13 RX ORDER — PROMETHAZINE HYDROCHLORIDE AND CODEINE PHOSPHATE 6.25; 1 MG/5ML; MG/5ML
5 SYRUP ORAL EVERY 4 HOURS PRN
Qty: 473 ML | Refills: 0 | Status: SHIPPED | OUTPATIENT
Start: 2024-11-13

## 2024-11-13 RX ORDER — ALBUTEROL SULFATE 90 UG/1
2 INHALANT RESPIRATORY (INHALATION) EVERY 6 HOURS PRN
Qty: 20.1 G | Refills: 3 | Status: SHIPPED | OUTPATIENT
Start: 2024-11-13

## 2024-11-13 RX ORDER — NAPROXEN 500 MG/1
500 TABLET ORAL 2 TIMES DAILY PRN
Qty: 30 TABLET | Refills: 0 | Status: SHIPPED | OUTPATIENT
Start: 2024-11-13

## 2024-11-13 RX ORDER — ACETAMINOPHEN 325 MG/1
975 TABLET ORAL ONCE
Status: COMPLETED | OUTPATIENT
Start: 2024-11-13 | End: 2024-11-13

## 2024-11-13 RX ADMIN — AMOXICILLIN AND CLAVULANATE POTASSIUM 1 TABLET: 875; 125 TABLET, FILM COATED ORAL at 23:18

## 2024-11-13 RX ADMIN — NAPROXEN 500 MG: 250 TABLET ORAL at 23:18

## 2024-11-13 RX ADMIN — ACETAMINOPHEN 325MG 975 MG: 325 TABLET ORAL at 23:18

## 2024-11-13 RX ADMIN — OXYCODONE HYDROCHLORIDE 5 MG: 5 TABLET ORAL at 23:18

## 2024-11-14 PROBLEM — H69.92 DYSFUNCTION OF LEFT EUSTACHIAN TUBE: Status: ACTIVE | Noted: 2024-11-14

## 2024-11-14 NOTE — ED PROVIDER NOTES
Time reflects when diagnosis was documented in both MDM as applicable and the Disposition within this note       Time User Action Codes Description Comment    11/13/2024 11:07 PM Tony Borja [H72.91] Perforation of right tympanic membrane     11/13/2024 11:14 PM Tony Borja [H92.09] Pain in ear           ED Disposition       ED Disposition   Discharge    Condition   Stable    Date/Time   Wed Nov 13, 2024 11:07 PM    Comment   Kena Dahl discharge to home/self care.                   Assessment & Plan       Medical Decision Making  This patient presents with right ear pain.   Diagnostic considerations include perforated TM, acute otitis media, mastoiditis, acute otitis externa.     Vital signs reviewed.  The patient's history/exam is concerning for a ruptured right TM. Tylenol/Naproxen/Oxycodone administered. The case was discussed with Dr. Che (ENT)--will see the patient tomorrow in the OP clinic. Holding off on topical abx. The patient was prescribed a course of Augmentin with the first dose administered in the ED.  The current course of amoxicillin was discontinued.     PRN Naproxen/Oxycodone prescribed--the patient was instructed not take oxycodone and promethazine/codeine together as oxycodone/codeine are similar medications and may cause side effects. Other recommendations/return precautions discussed. Stable for discharge.         Problems Addressed:  Pain in ear: acute illness or injury  Perforation of right tympanic membrane: acute illness or injury    Amount and/or Complexity of Data Reviewed  Discussion of management or test interpretation with external provider(s): The case and treatment plan were discussed with otolaryngology.    Risk  OTC drugs.  Prescription drug management.      Medications   amoxicillin-clavulanate (AUGMENTIN) 875-125 mg per tablet 1 tablet (1 tablet Oral Given 11/13/24 2318)   naproxen (NAPROSYN) tablet 500 mg (500 mg Oral Given 11/13/24 2318)   acetaminophen  (TYLENOL) tablet 975 mg (975 mg Oral Given 24 4588)   oxyCODONE (ROXICODONE) IR tablet 5 mg (5 mg Oral Given 24)     ED Risk Strat Scores           History of Present Illness       Chief Complaint   Patient presents with    Earache     Pt states she started with right ear pain tonight after a coughing fit     Past Medical History:   Diagnosis Date    Anxiety     COVID 2022    Diverticulitis     Diverticulitis of colon       Past Surgical History:   Procedure Laterality Date    APPENDECTOMY       SECTION      x 3    CHOLECYSTECTOMY      HYSTERECTOMY  2018    KNEE ARTHROSCOPY Bilateral     OOPHORECTOMY Right 2018      Family History   Problem Relation Age of Onset    Diabetes Mother     Colon cancer Father         60s    Cancer Father         prostate    Liver cancer Sister     No Known Problems Sister     No Known Problems Sister     No Known Problems Maternal Grandmother     No Known Problems Maternal Grandfather     No Known Problems Paternal Grandmother     No Known Problems Paternal Grandfather     No Known Problems Maternal Aunt     No Known Problems Maternal Aunt     No Known Problems Paternal Aunt       Social History     Tobacco Use    Smoking status: Every Day     Current packs/day: 0.50     Types: Cigarettes    Smokeless tobacco: Never    Tobacco comments:     30 pk yr hx   Vaping Use    Vaping status: Never Used   Substance Use Topics    Alcohol use: Yes     Comment: occasionally    Drug use: Yes     Types: Marijuana     Comment: once a month      E-Cigarette/Vaping    E-Cigarette Use Never User       E-Cigarette/Vaping Substances    Nicotine No     THC No     CBD No     Flavoring No     Other No     Unknown No       I have reviewed and agree with the history as documented.     Patient presents with severe right ear pain.  Currently being treated for an upper respiratory infection.  She states that while on the phone she had a forceful cough with severe right ear pain.  She  has been having worsening right ear pain over the last 2 hours.  States that her hearing is impaired in the right ear.  Denies drainage, fever/chills.      History provided by:  Patient  Earache  Location:  Right  Behind ear:  No abnormality  Quality:  Sharp and shooting  Severity:  Moderate  Onset quality:  Sudden  Duration:  2 hours  Timing:  Constant  Progression:  Worsening  Chronicity:  New  Context: recent URI    Relieved by:  None tried  Worsened by:  Coughing and palpation  Associated symptoms: congestion, cough, hearing loss and rhinorrhea    Associated symptoms: no ear discharge, no fever, no headaches, no neck pain, no rash and no sore throat      Review of Systems   Constitutional:  Positive for chills. Negative for fatigue and fever.   HENT:  Positive for congestion, ear pain, hearing loss, rhinorrhea, sinus pressure and sinus pain. Negative for dental problem, ear discharge, facial swelling, sore throat, trouble swallowing and voice change.    Respiratory:  Positive for cough. Negative for chest tightness, shortness of breath and wheezing.    Cardiovascular:  Negative for chest pain.   Musculoskeletal:  Negative for neck pain.   Skin:  Negative for rash.   Neurological:  Negative for headaches.   All other systems reviewed and are negative.    Objective       ED Triage Vitals [11/13/24 2247]   Temperature Pulse Blood Pressure Respirations SpO2 Patient Position - Orthostatic VS   97.9 °F (36.6 °C) 98 (!) 177/83 16 100 % Sitting      Temp Source Heart Rate Source BP Location FiO2 (%) Pain Score    Temporal Monitor Right arm -- 9      Vitals      Date and Time Temp Pulse SpO2 Resp BP Pain Score FACES Pain Rating User   11/13/24 3608 -- -- -- -- -- 10 - Worst Possible Pain --    11/13/24 2247 97.9 °F (36.6 °C) 98 100 % 16 177/83 9 -- AS          Physical Exam  Vitals and nursing note reviewed.   Constitutional:       General: She is in acute distress.      Appearance: She is not ill-appearing or  toxic-appearing.   HENT:      Head: Normocephalic and atraumatic.      Right Ear: Decreased hearing noted. Tenderness present. No drainage or swelling. There is no impacted cerumen. No mastoid tenderness. No hemotympanum. Tympanic membrane is perforated and bulging. Tympanic membrane is not erythematous.      Left Ear: A middle ear effusion is present.      Nose: Congestion present.   Eyes:      General: No scleral icterus.        Right eye: No discharge.         Left eye: No discharge.      Extraocular Movements: Extraocular movements intact.      Conjunctiva/sclera: Conjunctivae normal.   Cardiovascular:      Rate and Rhythm: Normal rate and regular rhythm.      Pulses: Normal pulses.      Heart sounds: Normal heart sounds. No murmur heard.  Pulmonary:      Effort: Pulmonary effort is normal. No respiratory distress.      Breath sounds: Normal breath sounds. No wheezing or rhonchi.   Chest:      Chest wall: No tenderness.   Abdominal:      General: Bowel sounds are normal.      Palpations: Abdomen is soft.      Tenderness: There is no abdominal tenderness. There is no guarding or rebound.   Musculoskeletal:      Cervical back: Neck supple. No tenderness.   Skin:     General: Skin is warm and dry.      Coloration: Skin is not jaundiced.      Findings: No bruising, erythema or rash.   Neurological:      General: No focal deficit present.      Mental Status: She is alert and oriented to person, place, and time.   Psychiatric:         Mood and Affect: Mood normal.         Behavior: Behavior normal.       Results Reviewed       None          No orders to display     Procedures    ED Medication and Procedure Management   Prior to Admission Medications   Prescriptions Last Dose Informant Patient Reported? Taking?   albuterol (Proventil HFA) 90 mcg/act inhaler   No No   Sig: Inhale 2 puffs every 6 (six) hours as needed for wheezing   amoxicillin (AMOXIL) 500 mg capsule   No No   Sig: Take 1 capsule (500 mg total) by mouth  every 8 (eight) hours for 7 days   fluticasone (FLONASE) 50 mcg/act nasal spray   No No   Si spray into each nostril daily   predniSONE 20 mg tablet   Yes No   Sig: Take 20 mg by mouth 2 (two) times a day with meals   promethazine-codeine (PHENERGAN WITH CODEINE) 6.25-10 mg/5 mL syrup   No No   Sig: Take 5 mL by mouth every 4 (four) hours as needed for cough      Facility-Administered Medications: None     Discharge Medication List as of 2024 11:16 PM        START taking these medications    Details   amoxicillin-clavulanate (AUGMENTIN) 875-125 mg per tablet Take 1 tablet by mouth every 12 (twelve) hours for 7 days, Starting 2024, Until 2024, Normal      naproxen (Naprosyn) 500 mg tablet Take 1 tablet (500 mg total) by mouth 2 (two) times a day as needed for mild pain, Starting 2024, Normal      oxyCODONE (Roxicodone) 5 immediate release tablet Take 1 tablet (5 mg total) by mouth every 6 (six) hours as needed for moderate pain or severe pain Max Daily Amount: 20 mg, Starting 2024, Normal           CONTINUE these medications which have NOT CHANGED    Details   albuterol (Proventil HFA) 90 mcg/act inhaler Inhale 2 puffs every 6 (six) hours as needed for wheezing, Starting 2024, Normal      fluticasone (FLONASE) 50 mcg/act nasal spray 1 spray into each nostril daily, Starting 2024, Normal      predniSONE 20 mg tablet Take 20 mg by mouth 2 (two) times a day with meals, Starting Sun 10/20/2024, Historical Med      promethazine-codeine (PHENERGAN WITH CODEINE) 6.25-10 mg/5 mL syrup Take 5 mL by mouth every 4 (four) hours as needed for cough, Starting 2024, Normal           STOP taking these medications       amoxicillin (AMOXIL) 500 mg capsule Comments:   Reason for Stopping:               ED SEPSIS DOCUMENTATION   Time reflects when diagnosis was documented in both MDM as applicable and the Disposition within this note       Time User Action  Codes Description Comment    11/13/2024 11:07 PM Tony Borja [H72.91] Perforation of right tympanic membrane     11/13/2024 11:14 PM Tony Borja [H92.09] Pain in ear                  Tony Borja DO  11/13/24 9939

## 2024-11-14 NOTE — DISCHARGE INSTRUCTIONS
Your exam is suspicious for a perforated right eardrum.    You are being prescribed a course of Augmentin.  Please take as directed.  Discontinue the amoxicillin.    For pain, you can take naproxen 500 mg every 12 hours and Tylenol 1000 mg every 6 hours.    For breakthrough pain, you are being prescribed a short course of oxycodone.  Please take as directed.  Do not take oxycodone with the promethazine/codeine as oxycodone and codeine are similar medications.  Do not drive/drink alcohol/operate heavy machinery while taking oxycodone and codeine.    Try to keep the right ear dry.    Call Dr. Che's office tomorrow to schedule an appointment.    Return to the emergency department for any concerning signs or symptoms.

## 2024-11-14 NOTE — PROGRESS NOTES
Name: Kena Dahl      : 1975      MRN: [id number]  Encounter Provider: Gogo Matt PA-C  Encounter Date: 2024   Encounter department: [facility] PRIMARY CARE  :  Assessment & Plan  Viral upper respiratory tract infection  Patient having continuous coughing x 2 days.  Mucus in her nose has now thickened.  She had been seen in the office for sinusitis and putrid smelling mucus on 2024.  Was treated with amoxicillin which she completed a 7-day course.  Coughing is unresponsive to Mucinex and Robitussin DM.  Continuous coughing has caused her to have a headache.  Her back and chest are very sore from ongoing coughing.    As part of shared decision making, Phenergan with codeine is being added to the treatment regimen due to unresponsiveness with just dextromethorphan and over-the-counter cough medicines which also included Delsym.  She hears herself wheezing and feels tight in her chest with breathing so she is going to also start an albuterol inhaler to be used as needed.       Acute cough  Coughing is nonstop.  She has not slept for 2 nights because of the amount of coughing that she has.  Unable to lay flat because of the coughing.  Feels a lot of postnasal drainage coming down the back of her throat.  Orders:    albuterol (Proventil HFA) 90 mcg/act inhaler; Inhale 2 puffs every 6 (six) hours as needed for wheezing    promethazine-codeine (PHENERGAN WITH CODEINE) 6.25-10 mg/5 mL syrup; Take 5 mL by mouth every 4 (four) hours as needed for cough    Dysfunction of left eustachian tube  Left ear continues to feel full.  Has had serous otitis media in the past.  Left ear shows some recurrent serous otitis media.  I have suggested that the patient use Afrin or Javier-Synephrine nasal spray for 3 days to help to clear the eustachian tube.  She has decreased hearing because of the fluid in that ear.       Myalgia  Muscle aches and pains are present.  Back and chest are  very sore from ongoing coughing.           History of Present Illness   HPI  Kena Dahl is a 49 y.o. female who presents for an acute care visit due to nonstop coughing x 2 days.  She recently completed a 7-day course of amoxicillin that she was given for acute sinusitis and a change in her nasal mucosa to have putrid smelling nasal discharge.    Not having fever or chills at this point.  No nausea vomiting or diarrhea.  Has been trying to have homemade chicken soup as much as possible to act as a mucolytic agent.  Has been using over-the-counter medications without effect.    Not having palpitations.  Feels lightheaded once sitting up quickly.  History obtained from: patient  Review of Systems: Has recently been treated for acute maxillary sinusitis.  Completed course of antibiotics.  No change in her bowel or bladder habits.  Current Outpatient Medications on File Prior to Visit   Medication Sig Dispense Refill    fluticasone (FLONASE) 50 mcg/act nasal spray 1 spray into each nostril daily 18.2 mL 3    predniSONE 20 mg tablet Take 20 mg by mouth 2 (two) times a day with meals       No current facility-administered medications on file prior to visit.      Social History     Tobacco Use    Smoking status: Every Day     Current packs/day: 0.50     Types: Cigarettes    Smokeless tobacco: Never    Tobacco comments:     30 pk yr hx   Vaping Use    Vaping status: Never Used   Substance and Sexual Activity    Alcohol use: Yes     Comment: occasionally    Drug use: Yes     Types: Marijuana     Comment: once a month    Sexual activity: Not on file        Objective   /70 (BP Location: Left arm, Patient Position: Sitting, Cuff Size: Standard)   Pulse 92   Temp 98.1 °F (36.7 °C) (Tympanic)   Wt 121 kg (266 lb)   SpO2 99%   BMI 44.26 kg/m²      Physical Exam: 49-year-old female who was coughing throughout the entire exam.  She had a nontoxic appearance.  She was afebrile and normotensive.    No tenderness on  palpation of her sinuses.  Left TM has fullness consistent with serous otitis media.  No pain on pressure on the tragus or pulling on the pinna.  Right TM is clear.  Throat was red from coughing but no exudate was noted.    Administrative Statements   I have spent a total time of 25 minutes in caring for this patient on the day of the visit/encounter including Diagnostic results, Prognosis, Risks and benefits of tx options, Patient and family education, Impressions, Counseling / Coordination of care, Documenting in the medical record, Reviewing / ordering tests, medicine, procedures  , and Obtaining or reviewing history  .    Patient to see me on or after March 26 for her annual physical.  Patient states that her coughing is so bad that she has dramatically decreased her cigarette use and I have encouraged her to become a non-smoker.

## 2024-11-14 NOTE — ASSESSMENT & PLAN NOTE
Left ear continues to feel full.  Has had serous otitis media in the past.  Left ear shows some recurrent serous otitis media.  I have suggested that the patient use Afrin or Javier-Synephrine nasal spray for 3 days to help to clear the eustachian tube.  She has decreased hearing because of the fluid in that ear.

## 2024-12-26 ENCOUNTER — OFFICE VISIT (OUTPATIENT)
Dept: FAMILY MEDICINE CLINIC | Facility: CLINIC | Age: 49
End: 2024-12-26
Payer: COMMERCIAL

## 2024-12-26 VITALS
BODY MASS INDEX: 44.81 KG/M2 | SYSTOLIC BLOOD PRESSURE: 122 MMHG | WEIGHT: 268.96 LBS | DIASTOLIC BLOOD PRESSURE: 72 MMHG | HEIGHT: 65 IN | OXYGEN SATURATION: 99 % | TEMPERATURE: 98.7 F

## 2024-12-26 DIAGNOSIS — H73.91 TM (TYMPANIC MEMBRANE DISORDER), RIGHT: ICD-10-CM

## 2024-12-26 DIAGNOSIS — J01.00 ACUTE NON-RECURRENT MAXILLARY SINUSITIS: ICD-10-CM

## 2024-12-26 DIAGNOSIS — Z72.0 SMOKING TRYING TO QUIT: ICD-10-CM

## 2024-12-26 DIAGNOSIS — H66.91 RIGHT ACUTE OTITIS MEDIA: ICD-10-CM

## 2024-12-26 DIAGNOSIS — H69.91 DYSFUNCTION OF RIGHT EUSTACHIAN TUBE: ICD-10-CM

## 2024-12-26 DIAGNOSIS — J06.9 URI, ACUTE: Primary | ICD-10-CM

## 2024-12-26 PROCEDURE — 99406 BEHAV CHNG SMOKING 3-10 MIN: CPT | Performed by: PHYSICIAN ASSISTANT

## 2024-12-26 PROCEDURE — 99215 OFFICE O/P EST HI 40 MIN: CPT | Performed by: PHYSICIAN ASSISTANT

## 2024-12-26 RX ORDER — DOXYCYCLINE HYCLATE 100 MG
100 TABLET ORAL 2 TIMES DAILY
Qty: 14 TABLET | Refills: 0 | Status: SHIPPED | OUTPATIENT
Start: 2024-12-26 | End: 2025-01-02

## 2024-12-26 NOTE — PROGRESS NOTES
Name: Kena Dahl      : 1975      MRN: [id number]  Encounter Provider: Gogo Matt PA-C  Encounter Date: 2024   Encounter department: [facility] PRIMARY CARE  :  Assessment & Plan  URI, acute  Patient had a perforation of her TM on 12/3/2024.  She was treated with multiple antibiotics including Augmentin x 2 amoxicillin and levofloxacin.  When she last saw Dr. Che on 12 3, the perforation site had healed and her antibiotics were discontinued.  She had also had been previously given moxifloxacin antibiotic drops for the perforation in her right ear.  She felt well for about a week and then her symptoms came back with coughing congestion and increased ear pain.       Smoking trying to quit  Patient is motivated to stop smoking.  A total of 5 minutes was spent in discussion with regard to smoking cessation aids.  Patient has decreased her cigarettes on a daily basis from 1 pack/day to 1/3 pack/day.  At this time, she is opting against any medication assistance.       Tm (tympanic membrane disorder), right  Patient has eustachian tube dysfunction and healing perforation site of her right TM.  She states that on 12/3/2024, she was on the phone with her sister and began having a severe episode of coughing followed by perforation of her right TM.  She was later seen by the emergency department and referred to Dr. Che who gave her a total of 4 rounds of antibiotics along with prednisone.  Her TM is now intact but she has increased fluid accumulation probably secondary to her eustachian tube dysfunction.  It was recommended that she start Afrin or Javier-Synephrine nasal spray for total of 3 days in addition to the doxycycline antibiotic that she was given for her acute otitis media.       Acute non-recurrent maxillary sinusitis  Patient has double sickening.  She has increased pain and tenderness of both maxillary sinuses.  Antibiotics were given to cover this along with  otitis media.  Orders:  •  doxycycline hyclate (VIBRA-TABS) 100 mg tablet; Take 1 tablet (100 mg total) by mouth 2 (two) times a day for 7 days    Dysfunction of right eustachian tube  Patient has thick mucus along with increasing coughing at night.  It is hard for her to expectorate the mucus.  This thick mucus has caused her eustachian tube to not be functional.  She has been given antibiotics for this condition.  Orders:  •  doxycycline hyclate (VIBRA-TABS) 100 mg tablet; Take 1 tablet (100 mg total) by mouth 2 (two) times a day for 7 days    Right acute otitis media  Acute infection of her right TM noted.  Doxycycline prescribed.  Orders:  •  doxycycline hyclate (VIBRA-TABS) 100 mg tablet; Take 1 tablet (100 mg total) by mouth 2 (two) times a day for 7 days           History of Present Illness     HPI: 49-year-old female well-known to me as my patient.  She has had several bouts of upper respiratory infections.  She was counseled to stop smoking.  Medication assistance was offered but the patient wishes to stop smoking on her own.  She also wants to lose weight at the same time.    We discussed how smoking cessation can lead to a 5 to 10 pound weight gain due to the oral fixation that is no longer being met.  We talked about the role of Zyban in decreasing cravings and not allowing as much weight gain the patient wants to try to do this on her own.  She states that next week is New Year's and she will use this as a New Year's resolution.  I left the door open for the patient to contact me if she changes her mind about medication assistance in her tobacco cessation.    She is decreased her cigarette consumption from 1 pack/day to 1/3 pack/day.  She states that she has lost the taste of cigarettes and is noticing just how bad the cigarette smell since her most recent illnesses.  She has been sick for almost a month with upper respiratory infection which has recurred after about a 10-day window of feeling better.   "Because of this double sickening, I am concerned about a bacterial secondary infection.  She has been on multiple antibiotics including 2 bouts of Augmentin and amoxicillin and most recently levofloxacin all prescribed by her ENT.  In order to treat this current infection and to possibly cover some atypical organisms, doxycycline is being given.  I did talk to her about sun sensitivity and the need to take active yogurt or probiotics in order to lessen the changes in gut jairo and potential for fungal infection.    She is very frustrated over her recurrent symptoms.  She previously had ordered a colonoscopy.  Her father had a history of colon cancer and so she is not eligible to do Cologuard testing.  She states she needs to get better before she even can think about having a colonoscopy performed.  She still has the referral and will be calling when she finally recovers.    She continues to have ear pain.  No active drainage from her ear.  Review of Systems: No chest pain heart palpitations dizziness lightheadedness syncope or near syncope.    Objective   /72   Temp 98.7 °F (37.1 °C) (Tympanic)   Ht 5' 5\" (1.651 m)   Wt 122 kg (268 lb 15.4 oz)   SpO2 99%   BMI 44.76 kg/m²      Physical Exam: Reviewed vital signs.  She is normotensive and afebrile.  She is alert and has a nontoxic appearance.  She was coughing intermittently throughout the exam.    No conjunctival inflammation.  Both TMs show swelling and fluid right ear more than left ear.  She has some hemoperitoneum of the right ear but no active perforation was noted.  She has sinus tenderness in the maxillary sinuses bilaterally.  Throat showed some mild hyperemia and there was a lot of postnasal drainage.    No cervical adenopathy.  Heart is regular rate without murmur rub or gallops.  Lungs are clear to auscultation.  Aerating bases well without accessory muscles of respiration.  Able to speak in full sentences.  No egophony.  Administrative " Statements   I have spent a total time of 40 minutes in caring for this patient on the day of the visit/encounter including Diagnostic results, Prognosis, Risks and benefits of tx options, Instructions for management, Patient and family education, Importance of tx compliance, Risk factor reductions, Impressions, Counseling / Coordination of care, Documenting in the medical record, Reviewing / ordering tests, medicine, procedures  , and Obtaining or reviewing history  .    I will see the patient on or after March 21 for her annual exam.  I would be happy to see her in the interim if her condition fails to improve.

## 2025-01-22 ENCOUNTER — OFFICE VISIT (OUTPATIENT)
Dept: OBGYN CLINIC | Facility: CLINIC | Age: 50
End: 2025-01-22
Payer: COMMERCIAL

## 2025-01-22 ENCOUNTER — HOSPITAL ENCOUNTER (OUTPATIENT)
Dept: RADIOLOGY | Facility: CLINIC | Age: 50
Discharge: HOME | End: 2025-01-22
Payer: COMMERCIAL

## 2025-01-22 VITALS — BODY MASS INDEX: 45.22 KG/M2 | WEIGHT: 271.4 LBS | HEIGHT: 65 IN

## 2025-01-22 DIAGNOSIS — M25.561 ACUTE PAIN OF RIGHT KNEE: ICD-10-CM

## 2025-01-22 DIAGNOSIS — M17.11 PRIMARY OSTEOARTHRITIS OF RIGHT KNEE: ICD-10-CM

## 2025-01-22 DIAGNOSIS — S83.91XA SPRAIN OF RIGHT KNEE, UNSPECIFIED LIGAMENT, INITIAL ENCOUNTER: ICD-10-CM

## 2025-01-22 DIAGNOSIS — M25.561 ACUTE PAIN OF RIGHT KNEE: Primary | ICD-10-CM

## 2025-01-22 PROBLEM — S86.911A STRAIN OF RIGHT KNEE, INITIAL ENCOUNTER: Status: ACTIVE | Noted: 2025-01-22

## 2025-01-22 PROCEDURE — 73564 X-RAY EXAM KNEE 4 OR MORE: CPT

## 2025-01-22 PROCEDURE — 99204 OFFICE O/P NEW MOD 45 MIN: CPT | Performed by: ORTHOPAEDIC SURGERY

## 2025-01-22 NOTE — PROGRESS NOTES
ASSESSMENT/PLAN:    Diagnoses and all orders for this visit:    Acute pain of right knee  -     XR knee 4+ vw right injury; Future    Sprain of right knee, unspecified ligament, initial encounter  -     T-Rom  Post Op Knee Brace    Primary osteoarthritis of right knee      Reviewed with patient at time of visit that physical exam and imaging demonstrate sprain or strain of Right knee.She does have some mild arthritic changes in the knee as well. Discussed various treatment options including physical therapy, anti-inflammatories, cold or heat modalities and activities as tolerated. She was placed in a TROM brace to provided support. She requested a note for work allowing for accommodations. She will follow up in 10-14 days for a re-evaluation. She may contact the office with any questions or concerns. The patient expresses understanding and is in agreement with today's treatment plan.         Return in about 2 weeks (around 2/5/2025) for 10-14 days, Strength and Motion Check.      _____________________________________________________  CHIEF COMPLAINT:  Chief Complaint   Patient presents with    Right Knee - Pain         SUBJECTIVE:  Kena Dahl is a 49 y.o. year old female who presents for initial evaluation of right knee pain. She states that she has had pain in the right knee for the last few months. About a week ago she caught herself from falling up a step causing her st stumble and land hard on the right leg. Saturday night she started having severe pain on the medial side of her knee with associated swelling. She states she has no pain with straight. When she attempts flexion her pain increases to a 8/10 pain.She describes the pain as sharp, stabbing and burning. She notes minimal sensations of giving out. She states the swelling is still present, but has decreased since Saturday. She is starting to be able to tolerate walking. She has tried Naproxen, and heat. She denies numbness or tingling. She denies back  pain. She has previous history of meniscus repair 20+ years ago.       PAST MEDICAL HISTORY:  Past Medical History:   Diagnosis Date    Anxiety     COVID 2022    Diverticulitis     Diverticulitis of colon        PAST SURGICAL HISTORY:  Past Surgical History:   Procedure Laterality Date    APPENDECTOMY       SECTION      x 3    CHOLECYSTECTOMY      HYSTERECTOMY  2018    KNEE ARTHROSCOPY Bilateral     OOPHORECTOMY Right 2018       FAMILY HISTORY:  Family History   Problem Relation Age of Onset    Diabetes Mother     Colon cancer Father         60s    Cancer Father         prostate    Liver cancer Sister     No Known Problems Sister     No Known Problems Sister     No Known Problems Maternal Grandmother     No Known Problems Maternal Grandfather     No Known Problems Paternal Grandmother     No Known Problems Paternal Grandfather     No Known Problems Maternal Aunt     No Known Problems Maternal Aunt     No Known Problems Paternal Aunt        SOCIAL HISTORY:  Social History     Tobacco Use    Smoking status: Every Day     Current packs/day: 0.50     Types: Cigarettes    Smokeless tobacco: Never    Tobacco comments:     30 pk yr hx   Vaping Use    Vaping status: Never Used   Substance Use Topics    Alcohol use: Yes     Comment: occasionally    Drug use: Yes     Types: Marijuana     Comment: once a month       MEDICATIONS:    Current Outpatient Medications:     albuterol (Proventil HFA) 90 mcg/act inhaler, Inhale 2 puffs every 6 (six) hours as needed for wheezing (Patient not taking: Reported on 2025), Disp: 20.1 g, Rfl: 3    fluticasone (FLONASE) 50 mcg/act nasal spray, 1 spray into each nostril daily (Patient not taking: Reported on 2025), Disp: 18.2 mL, Rfl: 3    naproxen (Naprosyn) 500 mg tablet, Take 1 tablet (500 mg total) by mouth 2 (two) times a day as needed for mild pain (Patient not taking: Reported on 2025), Disp: 30 tablet, Rfl: 0    oxyCODONE (Roxicodone) 5 immediate release  "tablet, Take 1 tablet (5 mg total) by mouth every 6 (six) hours as needed for moderate pain or severe pain Max Daily Amount: 20 mg (Patient not taking: Reported on 1/22/2025), Disp: 4 tablet, Rfl: 0    predniSONE 20 mg tablet, Take 20 mg by mouth 2 (two) times a day with meals (Patient not taking: Reported on 1/22/2025), Disp: , Rfl:     promethazine-codeine (PHENERGAN WITH CODEINE) 6.25-10 mg/5 mL syrup, Take 5 mL by mouth every 4 (four) hours as needed for cough (Patient not taking: Reported on 1/22/2025), Disp: 473 mL, Rfl: 0    ALLERGIES:  No Known Allergies    Review of systems:   Constitutional: Negative for fatigue, fever or loss of apetite.   HENT: Negative.    Respiratory: Negative for shortness of breath, dyspnea.    Cardiovascular: Negative for chest pain/tightness.   Gastrointestinal: Negative for abdominal pain, N/V.   Endocrine: Negative for cold/heat intolerance, unexplained weight loss/gain.   Genitourinary: Negative for flank pain, dysuria, hematuria.   Musculoskeletal: As noted in HPI   Skin: Negative for rash.    Neurological: Negative  Psychiatric/Behavioral: Negative for agitation.  _____________________________________________________  PHYSICAL EXAMINATION:    Height 5' 5\" (1.651 m), weight 123 kg (271 lb 6.4 oz).    General: well developed and well nourished, alert, oriented times 3, and appears comfortable  Psychiatric: Normal  HEENT: Benign  Cardiovascular: Regular    Pulmonary: No wheezing or stridor  Abdomen: Soft, Nontender  Skin: No masses, erythema, lacerations, fluctation, ulcerations  Neurovascular: Sensory and motor grossly intact    MUSCULOSKELETAL EXAMINATION:    right knee(s) -   Patient ambulates with antalgic gait pattern  Uses No assistive device  No anatomical deformity  Skin is warm and dry to touch with no signs of erythema, ecchymosis, or infection   Mild generalized soft tissue swelling noted  ROM (0° - 60°)   TTP over medial femoral condyle  Flexor and extensor mechanisms " are intact   Knee is stable to varus and valgus stress  - Anterior Drawer, - Posterior Drawer  - Brandin's at approximately 60 degrees of flexion with inability to flex the knee further  - Apley's compression and distraction  Patella tracks centrally without palpable crepitus  Calf compartments are soft and supple  - Solomon's sign  2+ DP and PT pulses with brisk capillary refill to the toes  Sural, saphenous, tibial, superficial, and deep peroneal motor and sensory distributions intact  Sensation light touch intact distally      ____________________________________________________  STUDIES REVIEWED:  X-Ray of right knee obtained on 1/22/2025 were reviewed and demonstrate  mild degenerative disease with some narrowing of the medial joint space and subchondral sclerosis .        Scribe Attestation      I,:  Sunita Nieves am acting as a scribe while in the presence of the attending physician.:       I,:  Axel Marshall DO personally performed the services described in this documentation    as scribed in my presence.:

## 2025-01-22 NOTE — LETTER
January 22, 2025     Patient: Kena Dahl  YOB: 1975  Date of Visit: 1/22/2025      To Whom it May Concern:    Kena Dahl is under my professional care. Kena was seen in my office on 1/22/2025. Kena may return to work on 1/22/2025.  She should be permitted a 5-minute period of time every hour allowing her to stand and ambulate.  This restriction will remain in effect until she is rechecked in 2 weeks .    If you have any questions or concerns, please don't hesitate to call.         Sincerely,          Axel Marshall DO        CC: No Recipients

## 2025-02-05 ENCOUNTER — OFFICE VISIT (OUTPATIENT)
Dept: OBGYN CLINIC | Facility: CLINIC | Age: 50
End: 2025-02-05
Payer: COMMERCIAL

## 2025-02-05 VITALS — WEIGHT: 274.8 LBS | HEIGHT: 65 IN | BODY MASS INDEX: 45.79 KG/M2

## 2025-02-05 DIAGNOSIS — M25.561 ACUTE PAIN OF RIGHT KNEE: ICD-10-CM

## 2025-02-05 DIAGNOSIS — M17.11 PRIMARY OSTEOARTHRITIS OF RIGHT KNEE: Primary | ICD-10-CM

## 2025-02-05 PROCEDURE — 99213 OFFICE O/P EST LOW 20 MIN: CPT | Performed by: ORTHOPAEDIC SURGERY

## 2025-02-05 PROCEDURE — 20610 DRAIN/INJ JOINT/BURSA W/O US: CPT | Performed by: PHYSICIAN ASSISTANT

## 2025-02-05 RX ORDER — IBUPROFEN 800 MG/1
800 TABLET, FILM COATED ORAL EVERY 6 HOURS PRN
COMMUNITY

## 2025-02-05 RX ADMIN — BUPIVACAINE HYDROCHLORIDE 4 ML: 2.5 INJECTION, SOLUTION INFILTRATION; PERINEURAL at 16:45

## 2025-02-05 RX ADMIN — TRIAMCINOLONE ACETONIDE 40 MG: 40 INJECTION, SUSPENSION INTRA-ARTICULAR; INTRAMUSCULAR at 16:45

## 2025-02-05 NOTE — PATIENT INSTRUCTIONS
Patient elected proceed with cortisone injection in the office today which is under sterile technique and patient tolerated well.  She was agreeable to at least 1 or 2 sessions of physical therapy for home exercise program.  It was reviewed with the patient that if more therapy could be encouraged that would be of increased benefit for her.  Will tentatively see the patient back approximately 4 to 5 weeks for routine follow-up.  She may apply ice to the knee 20 to 30 minutes along with elevation for pain and swelling as needed postinjection and throughout her treatment.  She may also use Tylenol evening postinjection.  He may use over-the-counter anti-inflammatory medication as tolerated.

## 2025-02-05 NOTE — PROGRESS NOTES
Name: Kena Dahl      : 1975      MRN: [id number]  Encounter Provider: Axel Marshall DO  Encounter Date: 2025   Encounter department: [facility] [place]  :  Assessment & Plan  Primary osteoarthritis of right knee  Patient elected proceed with cortisone injection in the office today which is under sterile technique and patient tolerated well.  She was agreeable to at least 1 or 2 sessions of physical therapy for home exercise program.  It was reviewed with the patient that if more therapy could be encouraged that would be of increased benefit for her.  Will tentatively see the patient back approximately 4 to 5 weeks for routine follow-up.  She may apply ice to the knee 20 to 30 minutes along with elevation for pain and swelling as needed postinjection and throughout her treatment.  She may also use Tylenol evening postinjection.  He may use over-the-counter anti-inflammatory medication as tolerated.  Orders:    Ambulatory Referral to Physical Therapy; Future    Acute pain of right knee             History of Present Illness   HPI  Kena Dahl is a 50 y.o. female who presents for general recheck of right knee strain and mild arthritis for evaluation of strength and range of motion.  Patient notes that she is having difficulty with anterior knee pain going up and down the stairs.  She reports that she kneeled on the bathtub to try and get out of the tub the other evening had pain and difficulty getting from the tub.  She denies gross locking of the knee but notes that the knee feels like it needs to pop and moves slowly.  She does have a history of arthroscopy x 2 with previous meniscectomy.  He denies giving way.  She wants to be active with her grandchildren.  She works in the [employer] office and has long hours from approximately 9-7 4 days a week which makes scheduling physical therapy difficult.  She feels her knee is swollen some and tight.    Review of Systems  Current  "Outpatient Medications on File Prior to Visit   Medication Sig Dispense Refill    ibuprofen (MOTRIN) 800 mg tablet Take 800 mg by mouth every 6 (six) hours as needed for mild pain      albuterol (Proventil HFA) 90 mcg/act inhaler Inhale 2 puffs every 6 (six) hours as needed for wheezing (Patient not taking: Reported on 2/5/2025) 20.1 g 3    fluticasone (FLONASE) 50 mcg/act nasal spray 1 spray into each nostril daily (Patient not taking: Reported on 2/5/2025) 18.2 mL 3    naproxen (Naprosyn) 500 mg tablet Take 1 tablet (500 mg total) by mouth 2 (two) times a day as needed for mild pain (Patient not taking: Reported on 2/5/2025) 30 tablet 0    oxyCODONE (Roxicodone) 5 immediate release tablet Take 1 tablet (5 mg total) by mouth every 6 (six) hours as needed for moderate pain or severe pain Max Daily Amount: 20 mg (Patient not taking: Reported on 2/5/2025) 4 tablet 0    predniSONE 20 mg tablet Take 20 mg by mouth 2 (two) times a day with meals (Patient not taking: Reported on 2/5/2025)      promethazine-codeine (PHENERGAN WITH CODEINE) 6.25-10 mg/5 mL syrup Take 5 mL by mouth every 4 (four) hours as needed for cough (Patient not taking: Reported on 2/5/2025) 473 mL 0     No current facility-administered medications on file prior to visit.      Social History     Tobacco Use    Smoking status: Every Day     Current packs/day: 0.50     Types: Cigarettes    Smokeless tobacco: Never    Tobacco comments:     30 pk yr hx   Vaping Use    Vaping status: Never Used   Substance and Sexual Activity    Alcohol use: Yes     Comment: occasionally    Drug use: Yes     Types: Marijuana     Comment: once a month    Sexual activity: Not on file        Objective   Ht 5' 5\" (1.651 m)   Wt 125 kg (274 lb 12.8 oz)   BMI 45.73 kg/m²      Physical Exam    Right knee notes trace effusion anteriorly with some popliteal fullness noted when compared to contralateral side.  Some patellofemoral crepitation.  There is mild medial patella facet " "tenderness.  Patellar hook test only causes minimal discomfort.  She is able to get full passive extension with passive flexion to approximately 115 degrees.  There is medial joint line and medial femoral condylar tenderness.  The previous portal sites are well-healed and difficult to visualize.  She notes medial knee discomfort with Brandin testing but no palpable click.  There is no increased discomfort with hyperextension of the knee in the supine position.  No gross ligamentous laxity.    Previous x-rays were reviewed with the patient.    Large joint arthrocentesis: R knee  [place] Protocol:  Procedure performed by:  Consent: Verbal consent obtained.  Consent given by: patient  Time out: Immediately prior to procedure a \"time out\" was called to verify the correct patient, procedure, equipment, support staff and site/side marked as required.  Timeout called at: 2/5/2025 5:11 PM.  Site marked: the operative site was marked  Patient identity confirmed: verbally with patient  Supporting Documentation  Indications: pain   Procedure Details  Location: knee - R knee  Preparation: Patient was prepped and draped in the usual sterile fashion  Needle size: 22 G  Ultrasound guidance: no  Approach: superior  Medications administered: 4 mL bupivacaine 0.25 %; 40 mg triamcinolone acetonide 40 mg/mL    Patient tolerance: patient tolerated the procedure well with no immediate complications  Dressing:  Sterile dressing applied          "

## 2025-02-05 NOTE — ASSESSMENT & PLAN NOTE
Patient elected proceed with cortisone injection in the office today which is under sterile technique and patient tolerated well.  She was agreeable to at least 1 or 2 sessions of physical therapy for home exercise program.  It was reviewed with the patient that if more therapy could be encouraged that would be of increased benefit for her.  Will tentatively see the patient back approximately 4 to 5 weeks for routine follow-up.  She may apply ice to the knee 20 to 30 minutes along with elevation for pain and swelling as needed postinjection and throughout her treatment.  She may also use Tylenol evening postinjection.  He may use over-the-counter anti-inflammatory medication as tolerated.  Orders:    Ambulatory Referral to Physical Therapy; Future

## 2025-02-07 RX ORDER — TRIAMCINOLONE ACETONIDE 40 MG/ML
40 INJECTION, SUSPENSION INTRA-ARTICULAR; INTRAMUSCULAR
Status: COMPLETED | OUTPATIENT
Start: 2025-02-05 | End: 2025-02-05

## 2025-02-07 RX ORDER — BUPIVACAINE HYDROCHLORIDE 2.5 MG/ML
4 INJECTION, SOLUTION INFILTRATION; PERINEURAL
Status: COMPLETED | OUTPATIENT
Start: 2025-02-05 | End: 2025-02-05

## 2025-06-12 ENCOUNTER — TELEPHONE (OUTPATIENT)
Age: 50
End: 2025-06-12

## 2025-06-12 NOTE — TELEPHONE ENCOUNTER
Noted will keep a watch out with Dr. Marshall's schedule and will call patient  if we get a cancellation.

## 2025-06-12 NOTE — TELEPHONE ENCOUNTER
Caller: Patient     Doctor: Dr. Marshall    Reason for call: Patient is scheduled for Monday, but is having severe pain in her rt knee and asking if there is any way she can be seen sooner. Stated she will see the PA too.     Call back#: [phone]

## 2025-06-16 ENCOUNTER — OFFICE VISIT (OUTPATIENT)
Dept: OBGYN CLINIC | Facility: CLINIC | Age: 50
End: 2025-06-16
Payer: COMMERCIAL

## 2025-06-16 VITALS — WEIGHT: 268 LBS | BODY MASS INDEX: 44.65 KG/M2 | HEIGHT: 65 IN

## 2025-06-16 DIAGNOSIS — M17.11 PRIMARY OSTEOARTHRITIS OF RIGHT KNEE: Primary | ICD-10-CM

## 2025-06-16 DIAGNOSIS — M25.561 ACUTE PAIN OF RIGHT KNEE: ICD-10-CM

## 2025-06-16 PROCEDURE — 20610 DRAIN/INJ JOINT/BURSA W/O US: CPT | Performed by: ORTHOPAEDIC SURGERY

## 2025-06-16 PROCEDURE — 99213 OFFICE O/P EST LOW 20 MIN: CPT | Performed by: ORTHOPAEDIC SURGERY

## 2025-06-16 RX ORDER — TRIAMCINOLONE ACETONIDE 40 MG/ML
40 INJECTION, SUSPENSION INTRA-ARTICULAR; INTRAMUSCULAR
Status: COMPLETED | OUTPATIENT
Start: 2025-06-16 | End: 2025-06-16

## 2025-06-16 RX ORDER — BUPIVACAINE HYDROCHLORIDE 2.5 MG/ML
4 INJECTION, SOLUTION INFILTRATION; PERINEURAL
Status: COMPLETED | OUTPATIENT
Start: 2025-06-16 | End: 2025-06-16

## 2025-06-16 RX ADMIN — TRIAMCINOLONE ACETONIDE 40 MG: 40 INJECTION, SUSPENSION INTRA-ARTICULAR; INTRAMUSCULAR at 13:15

## 2025-06-16 RX ADMIN — BUPIVACAINE HYDROCHLORIDE 4 ML: 2.5 INJECTION, SOLUTION INFILTRATION; PERINEURAL at 13:15

## 2025-06-16 NOTE — PROGRESS NOTES
"Name: Kena Dahl      : 1975      MRN: [id number]  Encounter Provider: Axel Marshall DO  Encounter Date: 2025   Encounter department: [facility] [place]  :  Assessment & Plan  Primary osteoarthritis of right knee  Treatment options were discussed.  She elected to proceed with injection of local and corticosteroid.  She tolerated this well.  Follow-up was discussed and she would prefer to return only if symptoms do not improve.  I have encouraged her to contact me if any questions or concerns arise.       chronic pain right knee         Large joint arthrocentesis: R knee    Performed by: Axel Marshall DO  Authorized by: Axel Marshall DO    Universal Protocol:  Consent: Verbal consent obtained  Consent given by: patient  Patient understanding: patient states understanding of the procedure being performed  Supporting Documentation  Indications: pain     Is this a [place] injection? NoProcedure Details  Location: knee - R knee  Approach: lateral  Medications administered: 4 mL bupivacaine 0.25 %; 40 mg triamcinolone acetonide 40 mg/mL              History of Present Illness   HPI  Kena Dahl is a 50 y.o. female who presents for reevaluation of her right knee.  She underwent corticosteroid injection a little more than 4 months ago and noted excellent response up until about 2 weeks ago.  She began to experience recurring knee pain with no specific etiologic trigger.  Symptoms have persisted over the past 2 weeks although they are not as severe as they were prior to her initial injection.  She complains of pain at rest and with activity.  She notes pain is greater when the knee is in flexion and extension.  She is interested in pursuing a repeat corticosteroid injection.      Review of Systems       Objective   Ht 5' 5\" (1.651 m)   Wt 122 kg (268 lb)   BMI 44.60 kg/m²      Physical Exam  Exam today demonstrates full extension and flexion of the knee to about 100 degrees " with complaints of pain during flexion.  She has tenderness over the medial knee including the femoral condyle and joint line.  There is mild varus which can be corrected toward neutral with valgus stress with complaints of medial pain.  She has no tenderness anteriorly or laterally.  There is no significant effusion noted.  The skin is warm and dry.  Distal motor and sensory exams are intact.

## 2025-06-16 NOTE — ASSESSMENT & PLAN NOTE
Treatment options were discussed.  She elected to proceed with injection of local and corticosteroid.  She tolerated this well.  Follow-up was discussed and she would prefer to return only if symptoms do not improve.  I have encouraged her to contact me if any questions or concerns arise.